# Patient Record
Sex: FEMALE | Race: WHITE | NOT HISPANIC OR LATINO | Employment: FULL TIME | URBAN - METROPOLITAN AREA
[De-identification: names, ages, dates, MRNs, and addresses within clinical notes are randomized per-mention and may not be internally consistent; named-entity substitution may affect disease eponyms.]

---

## 2017-01-11 ENCOUNTER — GENERIC CONVERSION - ENCOUNTER (OUTPATIENT)
Dept: OTHER | Facility: OTHER | Age: 47
End: 2017-01-11

## 2017-02-10 ENCOUNTER — LAB REQUISITION (OUTPATIENT)
Dept: LAB | Facility: HOSPITAL | Age: 47
End: 2017-02-10
Payer: COMMERCIAL

## 2017-02-10 ENCOUNTER — ALLSCRIPTS OFFICE VISIT (OUTPATIENT)
Dept: OTHER | Facility: OTHER | Age: 47
End: 2017-02-10

## 2017-02-10 DIAGNOSIS — D25.9 LEIOMYOMA OF UTERUS: ICD-10-CM

## 2017-02-10 DIAGNOSIS — Z12.31 ENCOUNTER FOR SCREENING MAMMOGRAM FOR MALIGNANT NEOPLASM OF BREAST: ICD-10-CM

## 2017-02-10 DIAGNOSIS — N92.6 IRREGULAR MENSTRUATION: ICD-10-CM

## 2017-02-10 PROCEDURE — 88305 TISSUE EXAM BY PATHOLOGIST: CPT | Performed by: OBSTETRICS & GYNECOLOGY

## 2017-02-16 ENCOUNTER — GENERIC CONVERSION - ENCOUNTER (OUTPATIENT)
Dept: OTHER | Facility: OTHER | Age: 47
End: 2017-02-16

## 2017-02-17 ENCOUNTER — GENERIC CONVERSION - ENCOUNTER (OUTPATIENT)
Dept: OTHER | Facility: OTHER | Age: 47
End: 2017-02-17

## 2017-02-21 ENCOUNTER — GENERIC CONVERSION - ENCOUNTER (OUTPATIENT)
Dept: OTHER | Facility: OTHER | Age: 47
End: 2017-02-21

## 2017-03-23 ENCOUNTER — ALLSCRIPTS OFFICE VISIT (OUTPATIENT)
Dept: OTHER | Facility: OTHER | Age: 47
End: 2017-03-23

## 2017-05-26 RX ORDER — IBUPROFEN 800 MG/1
800 TABLET ORAL EVERY 6 HOURS PRN
COMMUNITY

## 2017-05-26 RX ORDER — CETIRIZINE HYDROCHLORIDE 10 MG/1
10 TABLET ORAL AS NEEDED
COMMUNITY

## 2017-06-05 ENCOUNTER — ANESTHESIA EVENT (OUTPATIENT)
Dept: PERIOP | Facility: HOSPITAL | Age: 47
End: 2017-06-05
Payer: COMMERCIAL

## 2017-06-06 ENCOUNTER — HOSPITAL ENCOUNTER (OUTPATIENT)
Facility: HOSPITAL | Age: 47
Setting detail: OUTPATIENT SURGERY
Discharge: HOME | End: 2017-06-06
Attending: OBSTETRICS & GYNECOLOGY | Admitting: OBSTETRICS & GYNECOLOGY
Payer: COMMERCIAL

## 2017-06-06 ENCOUNTER — ANESTHESIA (OUTPATIENT)
Dept: PERIOP | Facility: HOSPITAL | Age: 47
End: 2017-06-06
Payer: COMMERCIAL

## 2017-06-06 VITALS
SYSTOLIC BLOOD PRESSURE: 149 MMHG | RESPIRATION RATE: 15 BRPM | WEIGHT: 150 LBS | HEIGHT: 66 IN | HEART RATE: 72 BPM | OXYGEN SATURATION: 99 % | TEMPERATURE: 98.3 F | DIASTOLIC BLOOD PRESSURE: 71 MMHG | BODY MASS INDEX: 24.11 KG/M2

## 2017-06-06 DIAGNOSIS — N84.0 POLYP OF CORPUS UTERI: ICD-10-CM

## 2017-06-06 LAB
EXT PREGNANCY TEST URINE: NEGATIVE
HCT VFR BLD AUTO: 39.3 % (ref 34.8–46.1)

## 2017-06-06 PROCEDURE — 85014 HEMATOCRIT: CPT | Performed by: OBSTETRICS & GYNECOLOGY

## 2017-06-06 PROCEDURE — 88305 TISSUE EXAM BY PATHOLOGIST: CPT | Performed by: OBSTETRICS & GYNECOLOGY

## 2017-06-06 PROCEDURE — 81025 URINE PREGNANCY TEST: CPT | Performed by: OBSTETRICS & GYNECOLOGY

## 2017-06-06 RX ORDER — OXYCODONE HYDROCHLORIDE AND ACETAMINOPHEN 5; 325 MG/1; MG/1
1 TABLET ORAL EVERY 4 HOURS PRN
Status: DISCONTINUED | OUTPATIENT
Start: 2017-06-06 | End: 2017-06-06 | Stop reason: HOSPADM

## 2017-06-06 RX ORDER — IBUPROFEN 600 MG/1
600 TABLET ORAL EVERY 6 HOURS PRN
Status: DISCONTINUED | OUTPATIENT
Start: 2017-06-06 | End: 2017-06-06 | Stop reason: HOSPADM

## 2017-06-06 RX ORDER — ONDANSETRON 2 MG/ML
4 INJECTION INTRAMUSCULAR; INTRAVENOUS EVERY 6 HOURS PRN
Status: DISCONTINUED | OUTPATIENT
Start: 2017-06-06 | End: 2017-06-06 | Stop reason: HOSPADM

## 2017-06-06 RX ORDER — MAGNESIUM HYDROXIDE 1200 MG/15ML
LIQUID ORAL AS NEEDED
Status: DISCONTINUED | OUTPATIENT
Start: 2017-06-06 | End: 2017-06-06 | Stop reason: HOSPADM

## 2017-06-06 RX ORDER — ACETAMINOPHEN 325 MG/1
650 TABLET ORAL EVERY 6 HOURS PRN
Status: DISCONTINUED | OUTPATIENT
Start: 2017-06-06 | End: 2017-06-06 | Stop reason: HOSPADM

## 2017-06-06 RX ORDER — ONDANSETRON 2 MG/ML
INJECTION INTRAMUSCULAR; INTRAVENOUS AS NEEDED
Status: DISCONTINUED | OUTPATIENT
Start: 2017-06-06 | End: 2017-06-06 | Stop reason: SURG

## 2017-06-06 RX ORDER — KETOROLAC TROMETHAMINE 30 MG/ML
INJECTION, SOLUTION INTRAMUSCULAR; INTRAVENOUS AS NEEDED
Status: DISCONTINUED | OUTPATIENT
Start: 2017-06-06 | End: 2017-06-06 | Stop reason: SURG

## 2017-06-06 RX ORDER — SODIUM CHLORIDE 9 MG/ML
125 INJECTION, SOLUTION INTRAVENOUS CONTINUOUS
Status: DISCONTINUED | OUTPATIENT
Start: 2017-06-06 | End: 2017-06-06 | Stop reason: HOSPADM

## 2017-06-06 RX ORDER — PROPOFOL 10 MG/ML
INJECTION, EMULSION INTRAVENOUS AS NEEDED
Status: DISCONTINUED | OUTPATIENT
Start: 2017-06-06 | End: 2017-06-06 | Stop reason: SURG

## 2017-06-06 RX ORDER — FENTANYL CITRATE/PF 50 MCG/ML
50 SYRINGE (ML) INJECTION
Status: DISCONTINUED | OUTPATIENT
Start: 2017-06-06 | End: 2017-06-06 | Stop reason: HOSPADM

## 2017-06-06 RX ORDER — SODIUM CHLORIDE 9 MG/ML
INJECTION, SOLUTION INTRAVENOUS AS NEEDED
Status: DISCONTINUED | OUTPATIENT
Start: 2017-06-06 | End: 2017-06-06 | Stop reason: HOSPADM

## 2017-06-06 RX ORDER — ONDANSETRON 2 MG/ML
4 INJECTION INTRAMUSCULAR; INTRAVENOUS ONCE AS NEEDED
Status: DISCONTINUED | OUTPATIENT
Start: 2017-06-06 | End: 2017-06-06 | Stop reason: HOSPADM

## 2017-06-06 RX ADMIN — SODIUM CHLORIDE 125 ML/HR: 0.9 INJECTION, SOLUTION INTRAVENOUS at 13:08

## 2017-06-06 RX ADMIN — LIDOCAINE HYDROCHLORIDE 100 MG: 20 INJECTION, SOLUTION INTRAVENOUS at 13:21

## 2017-06-06 RX ADMIN — DEXAMETHASONE SODIUM PHOSPHATE 4 MG: 10 INJECTION INTRAMUSCULAR; INTRAVENOUS at 13:25

## 2017-06-06 RX ADMIN — PROPOFOL 200 MG: 10 INJECTION, EMULSION INTRAVENOUS at 13:21

## 2017-06-06 RX ADMIN — ONDANSETRON HYDROCHLORIDE 4 MG: 2 INJECTION, SOLUTION INTRAVENOUS at 13:25

## 2017-06-06 RX ADMIN — KETOROLAC TROMETHAMINE 30 MG: 30 INJECTION, SOLUTION INTRAMUSCULAR at 13:35

## 2017-06-09 ENCOUNTER — GENERIC CONVERSION - ENCOUNTER (OUTPATIENT)
Dept: OTHER | Facility: OTHER | Age: 47
End: 2017-06-09

## 2017-08-27 ENCOUNTER — OFFICE VISIT (OUTPATIENT)
Dept: URGENT CARE | Facility: CLINIC | Age: 47
End: 2017-08-27
Payer: COMMERCIAL

## 2017-08-27 PROCEDURE — 99213 OFFICE O/P EST LOW 20 MIN: CPT

## 2018-01-09 NOTE — MISCELLANEOUS
Message   Recorded as Task   Date: 02/21/2017 06:26 AM, Created By: Dutch Orourke   Task Name: Follow Up   Assigned To: Deshawn Clay   Regarding Patient: Richard Esquivel, Status: Active   Comment:    Dutch Orourke - 21 Feb 2017 6:26 AM     TASK CREATED  please let pt know her US is normal with 2 small fibroids   Mi Lucio - 21 Feb 2017 10:42 AM     TASK EDITED  left message with pt        Active Problems    1  Breast disorder (611 9) (N64 9)   2  Breast self examination education, encounter for (V65 49) (Z71 89)   3  Edema (782 3) (R60 9)   4  Fibroids (218 9) (D25 9)   5  Heavy menses (626 2) (N92 0)   6  Irregular bleeding (626 4) (N92 6)   7  Irregular bleeding (626 4) (N92 6)   8  Other specified menopausal and perimenopausal disorders (627 8) (N95 8)   9  Visit for screening mammogram (V76 12) (Z12 31)    Current Meds   1  Furosemide 20 MG Oral Tablet (Lasix); TAKE 1 TABLET DAILY AS NEEDED; Therapy: 26KXL6778 to (Evaluate:02Mar2017)  Requested for: 52KYA2982; Last   Rx:[id number] Ordered    Allergies    1   No Known Drug Allergies    Signatures   Electronically signed by : Afshan Torres, ; Feb 21 2017 10:42AM EST                       (Author)

## 2018-01-11 NOTE — MISCELLANEOUS
Message   Recorded as Task   Date: 06/07/2017 10:03 AM, Created By: Abhi Romo   Task Name: Follow Up   Assigned To: Lilliana Ward   Regarding Patient: Jackson Cyr, Status: Active   CommentAria Miles - 07 Jun 2017 10:03 AM     TASK CREATED  Called patient for post op check  States feels fine, no pain, no fever, scant bleeding, slept well, bladder WNL  Thanks you for everything  Lindsay Chin - 07 Jun 2017 10:47 PM     TASK REPLIED TO: Previously Assigned To Lindsay kim        Active Problems    1  Breast disorder (611 9) (N64 9)   2  Breast self examination education, encounter for (V65 49) (Z71 89)   3  Edema (782 3) (R60 9)   4  Fibroids (218 9) (D25 9)   5  Heavy menses (626 2) (N92 0)   6  Irregular bleeding (626 4) (N92 6)   7  Irregular bleeding (626 4) (N92 6)   8  Other specified menopausal and perimenopausal disorders (627 8) (N95 8)   9  Visit for screening mammogram (V76 12) (Z12 31)    Current Meds   1  Furosemide 20 MG Oral Tablet (Lasix); TAKE 1 TABLET DAILY AS NEEDED; Therapy: 98KJF3536 to (Evaluate:02Mar2017)  Requested for: 25VDC2579; Last   Rx:[id number] Ordered    Allergies    1   No Known Drug Allergies    Signatures   Electronically signed by : Anoop Hahn, ; Jun 9 2017  8:14AM EST                       (Author)

## 2018-01-13 VITALS
WEIGHT: 166.25 LBS | DIASTOLIC BLOOD PRESSURE: 76 MMHG | HEIGHT: 65 IN | BODY MASS INDEX: 27.7 KG/M2 | SYSTOLIC BLOOD PRESSURE: 120 MMHG

## 2018-01-15 VITALS
HEIGHT: 65 IN | BODY MASS INDEX: 27.36 KG/M2 | DIASTOLIC BLOOD PRESSURE: 76 MMHG | WEIGHT: 164.25 LBS | SYSTOLIC BLOOD PRESSURE: 120 MMHG

## 2018-01-16 NOTE — MISCELLANEOUS
Message   Recorded as Task   Date: 02/14/2017 08:36 PM, Created By: Dutch Orourke   Task Name: Go to Result   Assigned To: Caseymikylah Presumkylah   Regarding Patient: Richard Esquivel, Status: In Progress   Comment:    Dutch Orourke - 14 Feb 2017 8:36 PM     TASK CREATED  please let pt know that her emb is benign and there is a benign polyp, would recommend removing it as it is most likely contributing to her irregular bleeding  We discussed this briefly at her visit  Mi uLcio - 15 Feb 2017 1:44 PM     TASK IN PROGRESS   Mi Lucio - 16 Feb 2017 9:17 AM     TASK EDITED  pt will schedule apt wo discuss surgery and cc after        Active Problems    1  Breast disorder (611 9) (N64 9)   2  Breast self examination education, encounter for (V65 49) (Z71 89)   3  Edema (782 3) (R60 9)   4  Fibroids (218 9) (D25 9)   5  Heavy menses (626 2) (N92 0)   6  Irregular bleeding (626 4) (N92 6)   7  Irregular bleeding (626 4) (N92 6)   8  Other specified menopausal and perimenopausal disorders (627 8) (N95 8)   9  Visit for screening mammogram (V76 12) (Z12 31)    Current Meds   1  Furosemide 20 MG Oral Tablet (Lasix); TAKE 1 TABLET DAILY AS NEEDED; Therapy: 12HIP6027 to (Evaluate:02Mar2017)  Requested for: 07LPC4354; Last   Rx:[id number] Ordered    Allergies    1   No Known Drug Allergies    Signatures   Electronically signed by : Afshan Torres, ; Feb 16 2017  9:17AM EST                       (Author)

## 2018-01-17 NOTE — MISCELLANEOUS
Message   Recorded as Task   Date: 06/09/2017 08:20 AM, Created By: Roxann Box   Task Name: Go to Result   Assigned To: Noam Anderson   Regarding Patient: Shalonda Garcia, Status: Active   Comment:    Roxann Box - 09 Jun 2017 8:20 AM     TASK CREATED  benign pathology from her D&C, if she is doing well and feels she does not need post op, does not need to come in   Patient informed  Feels fine  will cancel post op appointment  Active Problems    1  Breast disorder (611 9) (N64 9)   2  Breast self examination education, encounter for (V65 49) (Z71 89)   3  Edema (782 3) (R60 9)   4  Fibroids (218 9) (D25 9)   5  Heavy menses (626 2) (N92 0)   6  Irregular bleeding (626 4) (N92 6)   7  Irregular bleeding (626 4) (N92 6)   8  Other specified menopausal and perimenopausal disorders (627 8) (N95 8)   9  Visit for screening mammogram (V76 12) (Z12 31)    Current Meds   1  Furosemide 20 MG Oral Tablet (Lasix); TAKE 1 TABLET DAILY AS NEEDED; Therapy: 84RHV6223 to (Evaluate:02Mar2017)  Requested for: 61LBO7557; Last   Rx:[id number] Ordered    Allergies    1   No Known Drug Allergies    Signatures   Electronically signed by : Pillo Spangler, ; Jun 9 2017  2:03PM EST                       (Author)

## 2018-01-18 NOTE — MISCELLANEOUS
Message   Date: 11 Jan 2017 11:00 AM EST, Recorded By: Melissa Silva For: Sharona Anita: Giana Hagan, Self   Phone: [phone] [facility]), [phone] (Work)   Reason: Medical Complaint   pt is getting her cycle every 2 weeks    pt will schedule appt with enough time for a endo bx if needed    Active Problems    1  Breast disorder (611 9) (N64 9)   2  Breast self examination education, encounter for (V65 49) (Z71 89)   3  Edema (782 3) (R60 9)   4  Heavy menses (626 2) (N92 0)   5  Other specified menopausal and perimenopausal disorders (627 8) (N95 8)    Current Meds   1  Furosemide 20 MG Oral Tablet (Lasix); Take as directed; Therapy: 20OXY7109 to (Evaluate:85Yvf2430)  Requested for: 42CEP8156; Last   Rx:[id number] Ordered    Allergies    1   No Known Drug Allergies    Signatures   Electronically signed by : Lona Sotelo, ; Jan 11 2017 11:01AM EST                       (Author)

## 2018-01-24 DIAGNOSIS — Z12.31 ENCOUNTER FOR SCREENING MAMMOGRAM FOR MALIGNANT NEOPLASM OF BREAST: Primary | ICD-10-CM

## 2018-02-01 ENCOUNTER — TELEPHONE (OUTPATIENT)
Dept: OBGYN CLINIC | Facility: CLINIC | Age: 48
End: 2018-02-01

## 2018-08-20 ENCOUNTER — TRANSCRIBE ORDERS (OUTPATIENT)
Dept: ADMINISTRATIVE | Facility: HOSPITAL | Age: 48
End: 2018-08-20

## 2018-08-20 ENCOUNTER — HOSPITAL ENCOUNTER (OUTPATIENT)
Dept: RADIOLOGY | Facility: HOSPITAL | Age: 48
Discharge: HOME | End: 2018-08-20
Payer: COMMERCIAL

## 2018-08-20 ENCOUNTER — APPOINTMENT (OUTPATIENT)
Dept: LAB | Facility: HOSPITAL | Age: 48
End: 2018-08-20
Payer: COMMERCIAL

## 2018-08-20 DIAGNOSIS — R07.89 CHEST TIGHTNESS: ICD-10-CM

## 2018-08-20 DIAGNOSIS — M06.9 RHEUMATOID ARTHRITIS, INVOLVING UNSPECIFIED SITE, UNSPECIFIED RHEUMATOID FACTOR PRESENCE: ICD-10-CM

## 2018-08-20 DIAGNOSIS — Z82.49 FAMILY HISTORY OF EARLY CAD: ICD-10-CM

## 2018-08-20 DIAGNOSIS — R06.02 SOB (SHORTNESS OF BREATH): ICD-10-CM

## 2018-08-20 DIAGNOSIS — R06.02 SOB (SHORTNESS OF BREATH): Primary | ICD-10-CM

## 2018-08-20 LAB
ALBUMIN SERPL BCP-MCNC: 4.2 G/DL (ref 3.5–5.7)
ALP SERPL-CCNC: 75 U/L (ref 40–150)
ALT SERPL W P-5'-P-CCNC: 9 U/L (ref 7–52)
ANION GAP SERPL CALCULATED.3IONS-SCNC: 5 MMOL/L (ref 4–13)
AST SERPL W P-5'-P-CCNC: 15 U/L (ref 13–39)
BACTERIA UR QL AUTO: ABNORMAL /HPF
BASOPHILS # BLD AUTO: 0.1 THOUSANDS/ΜL (ref 0–0.1)
BASOPHILS NFR BLD AUTO: 1 % (ref 0–2)
BILIRUB SERPL-MCNC: 0.3 MG/DL (ref 0.2–1)
BILIRUB UR QL STRIP: NEGATIVE
BNP SERPL-MCNC: 32 PG/ML (ref 1–100)
BUN SERPL-MCNC: 12 MG/DL (ref 7–25)
CALCIUM SERPL-MCNC: 9.2 MG/DL (ref 8.6–10.5)
CHLORIDE SERPL-SCNC: 102 MMOL/L (ref 98–107)
CHOLEST SERPL-MCNC: 189 MG/DL (ref 0–200)
CLARITY UR: ABNORMAL
CO2 SERPL-SCNC: 29 MMOL/L (ref 21–31)
COLOR UR: ABNORMAL
CREAT SERPL-MCNC: 0.79 MG/DL (ref 0.6–1.2)
CRP SERPL QL: 3.2 MG/L
EOSINOPHIL # BLD AUTO: 0.3 THOUSAND/ΜL (ref 0–0.61)
EOSINOPHIL NFR BLD AUTO: 4 % (ref 0–5)
ERYTHROCYTE [DISTWIDTH] IN BLOOD BY AUTOMATED COUNT: 13.1 % (ref 11.5–14.5)
ERYTHROCYTE [SEDIMENTATION RATE] IN BLOOD: 18 MM/HOUR (ref 0–20)
GFR SERPL CREATININE-BSD FRML MDRD: 89 ML/MIN/1.73SQ M
GLUCOSE P FAST SERPL-MCNC: 86 MG/DL (ref 65–99)
GLUCOSE UR STRIP-MCNC: NEGATIVE MG/DL
HCT VFR BLD AUTO: 37.1 % (ref 34.8–46.1)
HDLC SERPL-MCNC: 54 MG/DL (ref 40–60)
HGB BLD-MCNC: 12.7 G/DL (ref 12–16)
HGB UR QL STRIP.AUTO: ABNORMAL
KETONES UR STRIP-MCNC: NEGATIVE MG/DL
LDLC SERPL CALC-MCNC: 115 MG/DL (ref 75–193)
LEUKOCYTE ESTERASE UR QL STRIP: NEGATIVE
LYMPHOCYTES # BLD AUTO: 1.6 THOUSANDS/ΜL (ref 0.6–4.47)
LYMPHOCYTES NFR BLD AUTO: 23 % (ref 21–51)
MCH RBC QN AUTO: 30.2 PG (ref 26–34)
MCHC RBC AUTO-ENTMCNC: 34.2 G/DL (ref 31–37)
MCV RBC AUTO: 88 FL (ref 81–99)
MONOCYTES # BLD AUTO: 0.5 THOUSAND/ΜL (ref 0.17–1.22)
MONOCYTES NFR BLD AUTO: 7 % (ref 2–12)
MUCOUS THREADS UR QL AUTO: ABNORMAL
NEUTROPHILS # BLD AUTO: 4.5 THOUSANDS/ΜL (ref 1.4–6.5)
NEUTS SEG NFR BLD AUTO: 65 % (ref 42–75)
NITRITE UR QL STRIP: NEGATIVE
NON-SQ EPI CELLS URNS QL MICRO: ABNORMAL /HPF
NONHDLC SERPL-MCNC: 135 MG/DL
NRBC BLD AUTO-RTO: 0 /100 WBCS
PH UR STRIP.AUTO: 6.5 [PH] (ref 5–8)
PLATELET # BLD AUTO: 239 THOUSANDS/UL (ref 149–390)
PMV BLD AUTO: 8.5 FL (ref 8.6–11.7)
POTASSIUM SERPL-SCNC: 3.9 MMOL/L (ref 3.5–5.5)
PROT SERPL-MCNC: 7.6 G/DL (ref 6.4–8.9)
PROT UR STRIP-MCNC: NEGATIVE MG/DL
RBC # BLD AUTO: 4.21 MILLION/UL (ref 3.9–5.2)
RBC #/AREA URNS AUTO: ABNORMAL /HPF
SODIUM SERPL-SCNC: 136 MMOL/L (ref 134–143)
SP GR UR STRIP.AUTO: 1.02 (ref 1–1.03)
TRIGL SERPL-MCNC: 100 MG/DL (ref 44–166)
TSH SERPL DL<=0.05 MIU/L-ACNC: 3.33 UIU/ML (ref 0.45–5.33)
UROBILINOGEN UR QL STRIP.AUTO: 0.2 E.U./DL
WBC # BLD AUTO: 6.9 THOUSAND/UL (ref 4.8–10.8)
WBC #/AREA URNS AUTO: ABNORMAL /HPF

## 2018-08-20 PROCEDURE — 86618 LYME DISEASE ANTIBODY: CPT

## 2018-08-20 PROCEDURE — 36415 COLL VENOUS BLD VENIPUNCTURE: CPT

## 2018-08-20 PROCEDURE — 86200 CCP ANTIBODY: CPT

## 2018-08-20 PROCEDURE — 71046 X-RAY EXAM CHEST 2 VIEWS: CPT

## 2018-08-20 PROCEDURE — 86038 ANTINUCLEAR ANTIBODIES: CPT

## 2018-08-20 PROCEDURE — 85025 COMPLETE CBC W/AUTO DIFF WBC: CPT

## 2018-08-20 PROCEDURE — 86430 RHEUMATOID FACTOR TEST QUAL: CPT

## 2018-08-20 PROCEDURE — 84443 ASSAY THYROID STIM HORMONE: CPT

## 2018-08-20 PROCEDURE — 86235 NUCLEAR ANTIGEN ANTIBODY: CPT

## 2018-08-20 PROCEDURE — 86039 ANTINUCLEAR ANTIBODIES (ANA): CPT

## 2018-08-20 PROCEDURE — 83880 ASSAY OF NATRIURETIC PEPTIDE: CPT

## 2018-08-20 PROCEDURE — 81001 URINALYSIS AUTO W/SCOPE: CPT | Performed by: NURSE PRACTITIONER

## 2018-08-20 PROCEDURE — 80053 COMPREHEN METABOLIC PANEL: CPT

## 2018-08-20 PROCEDURE — 80061 LIPID PANEL: CPT

## 2018-08-20 PROCEDURE — 85652 RBC SED RATE AUTOMATED: CPT

## 2018-08-20 PROCEDURE — 81003 URINALYSIS AUTO W/O SCOPE: CPT | Performed by: NURSE PRACTITIONER

## 2018-08-20 PROCEDURE — 86140 C-REACTIVE PROTEIN: CPT

## 2018-08-21 LAB
B BURGDOR IGG SER IA-ACNC: 0.11
B BURGDOR IGM SER IA-ACNC: 0.18
CCP IGA+IGG SERPL IA-ACNC: 10 UNITS (ref 0–19)
ENA SS-A AB SER-ACNC: <0.2 AI (ref 0–0.9)
ENA SS-B AB SER-ACNC: <0.2 AI (ref 0–0.9)
RHEUMATOID FACT SER QL LA: NEGATIVE

## 2018-08-22 LAB
ANA HOMOGEN SER QL IF: NORMAL
ANA HOMOGEN TITR SER: NORMAL {TITER}
RYE IGE QN: POSITIVE

## 2019-05-23 ENCOUNTER — ANNUAL EXAM (OUTPATIENT)
Dept: OBGYN CLINIC | Facility: CLINIC | Age: 49
End: 2019-05-23
Payer: COMMERCIAL

## 2019-05-23 VITALS
HEIGHT: 66 IN | WEIGHT: 183 LBS | SYSTOLIC BLOOD PRESSURE: 134 MMHG | BODY MASS INDEX: 29.41 KG/M2 | DIASTOLIC BLOOD PRESSURE: 70 MMHG

## 2019-05-23 DIAGNOSIS — Z11.51 SCREENING FOR HPV (HUMAN PAPILLOMAVIRUS): ICD-10-CM

## 2019-05-23 DIAGNOSIS — Z12.31 ENCOUNTER FOR SCREENING MAMMOGRAM FOR BREAST CANCER: ICD-10-CM

## 2019-05-23 DIAGNOSIS — Z12.4 CERVICAL CANCER SCREENING: ICD-10-CM

## 2019-05-23 DIAGNOSIS — Z01.419 ENCOUNTER FOR ANNUAL ROUTINE GYNECOLOGICAL EXAMINATION: Primary | ICD-10-CM

## 2019-05-23 PROCEDURE — 88141 CYTOPATH C/V INTERPRET: CPT | Performed by: PATHOLOGY

## 2019-05-23 PROCEDURE — G0143 SCR C/V CYTO,THINLAYER,RESCR: HCPCS | Performed by: PATHOLOGY

## 2019-05-23 PROCEDURE — S0612 ANNUAL GYNECOLOGICAL EXAMINA: HCPCS | Performed by: OBSTETRICS & GYNECOLOGY

## 2019-05-23 PROCEDURE — 87624 HPV HI-RISK TYP POOLED RSLT: CPT | Performed by: OBSTETRICS & GYNECOLOGY

## 2019-05-23 RX ORDER — ALBUTEROL SULFATE 90 UG/1
2 AEROSOL, METERED RESPIRATORY (INHALATION) EVERY 4 HOURS PRN
COMMUNITY
Start: 2019-05-21

## 2019-05-28 LAB
HPV HR 12 DNA CVX QL NAA+PROBE: NEGATIVE
HPV16 DNA CVX QL NAA+PROBE: NEGATIVE
HPV18 DNA CVX QL NAA+PROBE: NEGATIVE

## 2019-06-03 LAB
LAB AP GYN PRIMARY INTERPRETATION: NORMAL
Lab: NORMAL
PATH INTERP SPEC-IMP: NORMAL

## 2019-06-08 ENCOUNTER — HOSPITAL ENCOUNTER (OUTPATIENT)
Dept: MAMMOGRAPHY | Facility: HOSPITAL | Age: 49
Discharge: HOME | End: 2019-06-08
Payer: COMMERCIAL

## 2019-06-08 VITALS — WEIGHT: 175 LBS | HEIGHT: 66 IN | BODY MASS INDEX: 28.12 KG/M2

## 2019-06-08 DIAGNOSIS — Z12.31 ENCOUNTER FOR SCREENING MAMMOGRAM FOR BREAST CANCER: ICD-10-CM

## 2019-06-08 PROCEDURE — 77067 SCR MAMMO BI INCL CAD: CPT

## 2019-06-08 PROCEDURE — 77063 BREAST TOMOSYNTHESIS BI: CPT

## 2019-06-28 ENCOUNTER — PROCEDURE VISIT (OUTPATIENT)
Dept: OBGYN CLINIC | Facility: CLINIC | Age: 49
End: 2019-06-28
Payer: COMMERCIAL

## 2019-06-28 VITALS — BODY MASS INDEX: 29.38 KG/M2 | SYSTOLIC BLOOD PRESSURE: 130 MMHG | DIASTOLIC BLOOD PRESSURE: 82 MMHG | WEIGHT: 182 LBS

## 2019-06-28 DIAGNOSIS — R87.619 ENDOMETRIAL CELLS ON CERVICAL PAP SMEAR INCONSISTENT W/LMP: Primary | ICD-10-CM

## 2019-06-28 DIAGNOSIS — N92.1 MENORRHAGIA WITH IRREGULAR CYCLE: Primary | ICD-10-CM

## 2019-06-28 DIAGNOSIS — N92.6 IRREGULAR MENSES: ICD-10-CM

## 2019-06-28 PROCEDURE — G0145 SCR C/V CYTO,THINLAYER,RESCR: HCPCS | Performed by: OBSTETRICS & GYNECOLOGY

## 2019-06-28 PROCEDURE — 58340 CATHETER FOR HYSTEROGRAPHY: CPT | Performed by: OBSTETRICS & GYNECOLOGY

## 2019-06-28 PROCEDURE — 76830 TRANSVAGINAL US NON-OB: CPT | Performed by: OBSTETRICS & GYNECOLOGY

## 2019-06-28 PROCEDURE — 99213 OFFICE O/P EST LOW 20 MIN: CPT | Performed by: OBSTETRICS & GYNECOLOGY

## 2019-06-28 PROCEDURE — 76831 ECHO EXAM UTERUS: CPT | Performed by: OBSTETRICS & GYNECOLOGY

## 2019-07-05 ENCOUNTER — TELEPHONE (OUTPATIENT)
Dept: OBGYN CLINIC | Facility: CLINIC | Age: 49
End: 2019-07-05

## 2019-07-05 LAB
LAB AP GYN PRIMARY INTERPRETATION: NORMAL
Lab: NORMAL

## 2019-07-22 PROBLEM — N92.6 IRREGULAR MENSES: Status: ACTIVE | Noted: 2019-07-22

## 2019-08-20 ENCOUNTER — HOSPITAL ENCOUNTER (OUTPATIENT)
Dept: NON INVASIVE DIAGNOSTICS | Facility: HOSPITAL | Age: 49
Discharge: HOME | End: 2019-08-20
Payer: COMMERCIAL

## 2019-08-20 DIAGNOSIS — Z01.818 PRE-OP TESTING: ICD-10-CM

## 2019-08-20 LAB
ATRIAL RATE: 87 BPM
P AXIS: 57 DEGREES
PR INTERVAL: 146 MS
QRS AXIS: 58 DEGREES
QRSD INTERVAL: 88 MS
QT INTERVAL: 372 MS
QTC INTERVAL: 447 MS
T WAVE AXIS: 28 DEGREES
VENTRICULAR RATE: 87 BPM

## 2019-08-20 PROCEDURE — 93005 ELECTROCARDIOGRAM TRACING: CPT

## 2019-08-20 PROCEDURE — 93010 ELECTROCARDIOGRAM REPORT: CPT | Performed by: INTERNAL MEDICINE

## 2019-08-24 ENCOUNTER — APPOINTMENT (OUTPATIENT)
Dept: LAB | Facility: HOSPITAL | Age: 49
End: 2019-08-24
Payer: COMMERCIAL

## 2019-08-24 DIAGNOSIS — Z01.818 PRE-OP TESTING: ICD-10-CM

## 2019-08-24 LAB
ANION GAP SERPL CALCULATED.3IONS-SCNC: 7 MMOL/L (ref 4–13)
ATRIAL RATE: 87 BPM
BUN SERPL-MCNC: 15 MG/DL (ref 7–25)
CALCIUM SERPL-MCNC: 9.4 MG/DL (ref 8.6–10.5)
CHLORIDE SERPL-SCNC: 102 MMOL/L (ref 98–107)
CO2 SERPL-SCNC: 27 MMOL/L (ref 21–31)
CREAT SERPL-MCNC: 0.83 MG/DL (ref 0.6–1.2)
ERYTHROCYTE [DISTWIDTH] IN BLOOD BY AUTOMATED COUNT: 12.9 % (ref 11.5–14.5)
GFR SERPL CREATININE-BSD FRML MDRD: 83 ML/MIN/1.73SQ M
GLUCOSE P FAST SERPL-MCNC: 90 MG/DL (ref 65–99)
HCT VFR BLD AUTO: 38.7 % (ref 42–47)
HGB BLD-MCNC: 13 G/DL (ref 12–16)
MCH RBC QN AUTO: 29.7 PG (ref 26–34)
MCHC RBC AUTO-ENTMCNC: 33.7 G/DL (ref 31–37)
MCV RBC AUTO: 88 FL (ref 81–99)
P AXIS: 57 DEGREES
PLATELET # BLD AUTO: 268 THOUSANDS/UL (ref 149–390)
PMV BLD AUTO: 7.9 FL (ref 8.6–11.7)
POTASSIUM SERPL-SCNC: 4.2 MMOL/L (ref 3.5–5.5)
PR INTERVAL: 146 MS
QRS AXIS: 58 DEGREES
QRSD INTERVAL: 88 MS
QT INTERVAL: 372 MS
QTC INTERVAL: 447 MS
RBC # BLD AUTO: 4.38 MILLION/UL (ref 3.9–5.2)
SODIUM SERPL-SCNC: 136 MMOL/L (ref 134–143)
T WAVE AXIS: 28 DEGREES
VENTRICULAR RATE: 87 BPM
WBC # BLD AUTO: 7.4 THOUSAND/UL (ref 4.8–10.8)

## 2019-08-24 PROCEDURE — 85027 COMPLETE CBC AUTOMATED: CPT

## 2019-08-24 PROCEDURE — 80048 BASIC METABOLIC PNL TOTAL CA: CPT

## 2019-08-24 PROCEDURE — 93010 ELECTROCARDIOGRAM REPORT: CPT | Performed by: INTERNAL MEDICINE

## 2019-08-24 PROCEDURE — 36415 COLL VENOUS BLD VENIPUNCTURE: CPT

## 2019-08-29 NOTE — PRE-PROCEDURE INSTRUCTIONS
Pre-Surgery Instructions:   Medication Instructions    albuterol (PROVENTIL HFA,VENTOLIN HFA) 90 mcg/act inhaler Instructed patient per Anesthesia Guidelines   cetirizine (ZyrTEC) 10 mg tablet Instructed patient per Anesthesia Guidelines   ibuprofen (MOTRIN) 800 mg tablet Patient was instructed by Physician and understands  Instructed to use albuterol inhaler am of surgery if needed per anesthesia

## 2019-08-30 ENCOUNTER — ANESTHESIA EVENT (OUTPATIENT)
Dept: PERIOP | Facility: HOSPITAL | Age: 49
End: 2019-08-30
Payer: COMMERCIAL

## 2019-09-03 ENCOUNTER — ANESTHESIA (OUTPATIENT)
Dept: PERIOP | Facility: HOSPITAL | Age: 49
End: 2019-09-03
Payer: COMMERCIAL

## 2019-09-03 ENCOUNTER — HOSPITAL ENCOUNTER (OUTPATIENT)
Facility: HOSPITAL | Age: 49
Setting detail: OUTPATIENT SURGERY
Discharge: HOME | End: 2019-09-03
Attending: OBSTETRICS & GYNECOLOGY | Admitting: OBSTETRICS & GYNECOLOGY
Payer: COMMERCIAL

## 2019-09-03 VITALS
DIASTOLIC BLOOD PRESSURE: 73 MMHG | SYSTOLIC BLOOD PRESSURE: 146 MMHG | HEART RATE: 78 BPM | BODY MASS INDEX: 28.93 KG/M2 | TEMPERATURE: 98.1 F | RESPIRATION RATE: 16 BRPM | HEIGHT: 66 IN | WEIGHT: 180 LBS | OXYGEN SATURATION: 99 %

## 2019-09-03 DIAGNOSIS — N92.6 IRREGULAR MENSES: Primary | ICD-10-CM

## 2019-09-03 LAB
EXT PREGNANCY TEST URINE: NEGATIVE
EXT. CONTROL: NORMAL

## 2019-09-03 PROCEDURE — 81025 URINE PREGNANCY TEST: CPT | Performed by: ANESTHESIOLOGY

## 2019-09-03 PROCEDURE — 88305 TISSUE EXAM BY PATHOLOGIST: CPT | Performed by: PATHOLOGY

## 2019-09-03 PROCEDURE — 58558 HYSTEROSCOPY BIOPSY: CPT | Performed by: OBSTETRICS & GYNECOLOGY

## 2019-09-03 PROCEDURE — NC001 PR NO CHARGE: Performed by: OBSTETRICS & GYNECOLOGY

## 2019-09-03 RX ORDER — SODIUM CHLORIDE 9 MG/ML
INJECTION, SOLUTION INTRAVENOUS AS NEEDED
Status: DISCONTINUED | OUTPATIENT
Start: 2019-09-03 | End: 2019-09-03 | Stop reason: HOSPADM

## 2019-09-03 RX ORDER — IBUPROFEN 600 MG/1
600 TABLET ORAL EVERY 6 HOURS PRN
Status: DISCONTINUED | OUTPATIENT
Start: 2019-09-03 | End: 2019-09-03 | Stop reason: HOSPADM

## 2019-09-03 RX ORDER — ALBUTEROL SULFATE 90 UG/1
2 AEROSOL, METERED RESPIRATORY (INHALATION) EVERY 4 HOURS PRN
Status: DISCONTINUED | OUTPATIENT
Start: 2019-09-03 | End: 2019-09-03 | Stop reason: HOSPADM

## 2019-09-03 RX ORDER — MAGNESIUM HYDROXIDE 1200 MG/15ML
LIQUID ORAL AS NEEDED
Status: DISCONTINUED | OUTPATIENT
Start: 2019-09-03 | End: 2019-09-03 | Stop reason: HOSPADM

## 2019-09-03 RX ORDER — DEXAMETHASONE SODIUM PHOSPHATE 4 MG/ML
INJECTION, SOLUTION INTRA-ARTICULAR; INTRALESIONAL; INTRAMUSCULAR; INTRAVENOUS; SOFT TISSUE AS NEEDED
Status: DISCONTINUED | OUTPATIENT
Start: 2019-09-03 | End: 2019-09-03 | Stop reason: SURG

## 2019-09-03 RX ORDER — PROPOFOL 10 MG/ML
INJECTION, EMULSION INTRAVENOUS AS NEEDED
Status: DISCONTINUED | OUTPATIENT
Start: 2019-09-03 | End: 2019-09-03 | Stop reason: SURG

## 2019-09-03 RX ORDER — FENTANYL CITRATE 50 UG/ML
INJECTION, SOLUTION INTRAMUSCULAR; INTRAVENOUS AS NEEDED
Status: DISCONTINUED | OUTPATIENT
Start: 2019-09-03 | End: 2019-09-03 | Stop reason: SURG

## 2019-09-03 RX ORDER — ACETAMINOPHEN 325 MG/1
650 TABLET ORAL EVERY 6 HOURS PRN
Status: DISCONTINUED | OUTPATIENT
Start: 2019-09-03 | End: 2019-09-03 | Stop reason: HOSPADM

## 2019-09-03 RX ORDER — LIDOCAINE HYDROCHLORIDE 10 MG/ML
INJECTION, SOLUTION EPIDURAL; INFILTRATION; INTRACAUDAL; PERINEURAL AS NEEDED
Status: DISCONTINUED | OUTPATIENT
Start: 2019-09-03 | End: 2019-09-03 | Stop reason: HOSPADM

## 2019-09-03 RX ORDER — FENTANYL CITRATE/PF 50 MCG/ML
25 SYRINGE (ML) INJECTION
Status: DISCONTINUED | OUTPATIENT
Start: 2019-09-03 | End: 2019-09-03 | Stop reason: HOSPADM

## 2019-09-03 RX ORDER — PROPOFOL 10 MG/ML
INJECTION, EMULSION INTRAVENOUS CONTINUOUS PRN
Status: DISCONTINUED | OUTPATIENT
Start: 2019-09-03 | End: 2019-09-03 | Stop reason: SURG

## 2019-09-03 RX ORDER — KETOROLAC TROMETHAMINE 30 MG/ML
INJECTION, SOLUTION INTRAMUSCULAR; INTRAVENOUS AS NEEDED
Status: DISCONTINUED | OUTPATIENT
Start: 2019-09-03 | End: 2019-09-03 | Stop reason: SURG

## 2019-09-03 RX ORDER — ONDANSETRON 2 MG/ML
4 INJECTION INTRAMUSCULAR; INTRAVENOUS ONCE AS NEEDED
Status: DISCONTINUED | OUTPATIENT
Start: 2019-09-03 | End: 2019-09-03 | Stop reason: HOSPADM

## 2019-09-03 RX ORDER — ACETAMINOPHEN 325 MG/1
650 TABLET ORAL EVERY 6 HOURS PRN
Qty: 30 TABLET | Refills: 0
Start: 2019-09-03

## 2019-09-03 RX ORDER — IBUPROFEN 200 MG
600 TABLET ORAL EVERY 6 HOURS PRN
Refills: 0
Start: 2019-09-03

## 2019-09-03 RX ORDER — OXYCODONE HYDROCHLORIDE 5 MG/1
5 TABLET ORAL EVERY 4 HOURS PRN
Status: DISCONTINUED | OUTPATIENT
Start: 2019-09-03 | End: 2019-09-03 | Stop reason: HOSPADM

## 2019-09-03 RX ORDER — ONDANSETRON 2 MG/ML
4 INJECTION INTRAMUSCULAR; INTRAVENOUS EVERY 6 HOURS PRN
Status: DISCONTINUED | OUTPATIENT
Start: 2019-09-03 | End: 2019-09-03 | Stop reason: HOSPADM

## 2019-09-03 RX ORDER — SODIUM CHLORIDE 9 MG/ML
125 INJECTION, SOLUTION INTRAVENOUS CONTINUOUS
Status: DISCONTINUED | OUTPATIENT
Start: 2019-09-03 | End: 2019-09-03

## 2019-09-03 RX ORDER — ONDANSETRON 2 MG/ML
INJECTION INTRAMUSCULAR; INTRAVENOUS AS NEEDED
Status: DISCONTINUED | OUTPATIENT
Start: 2019-09-03 | End: 2019-09-03 | Stop reason: SURG

## 2019-09-03 RX ORDER — MIDAZOLAM HYDROCHLORIDE 1 MG/ML
INJECTION INTRAMUSCULAR; INTRAVENOUS AS NEEDED
Status: DISCONTINUED | OUTPATIENT
Start: 2019-09-03 | End: 2019-09-03 | Stop reason: SURG

## 2019-09-03 RX ADMIN — PROPOFOL 40 MG: 10 INJECTION, EMULSION INTRAVENOUS at 13:31

## 2019-09-03 RX ADMIN — SODIUM CHLORIDE 125 ML/HR: 0.9 INJECTION, SOLUTION INTRAVENOUS at 12:35

## 2019-09-03 RX ADMIN — KETOROLAC TROMETHAMINE 30 MG: 30 INJECTION, SOLUTION INTRAMUSCULAR at 13:52

## 2019-09-03 RX ADMIN — FENTANYL CITRATE 25 MCG: 50 INJECTION, SOLUTION INTRAMUSCULAR; INTRAVENOUS at 13:38

## 2019-09-03 RX ADMIN — MIDAZOLAM 2 MG: 1 INJECTION INTRAMUSCULAR; INTRAVENOUS at 13:27

## 2019-09-03 RX ADMIN — ONDANSETRON 4 MG: 2 INJECTION INTRAMUSCULAR; INTRAVENOUS at 13:33

## 2019-09-03 RX ADMIN — SODIUM CHLORIDE 125 ML/HR: 0.9 INJECTION, SOLUTION INTRAVENOUS at 15:07

## 2019-09-03 RX ADMIN — FENTANYL CITRATE 25 MCG: 50 INJECTION, SOLUTION INTRAMUSCULAR; INTRAVENOUS at 13:44

## 2019-09-03 RX ADMIN — DEXAMETHASONE SODIUM PHOSPHATE 4 MG: 4 INJECTION, SOLUTION INTRAMUSCULAR; INTRAVENOUS at 13:33

## 2019-09-03 RX ADMIN — PROPOFOL 80 MCG/KG/MIN: 10 INJECTION, EMULSION INTRAVENOUS at 13:31

## 2019-09-03 NOTE — ANESTHESIA PREPROCEDURE EVALUATION
Review of Systems/Medical History  Patient summary reviewed  Chart reviewed  No history of anesthetic complications     Cardiovascular  Negative cardio ROS    Pulmonary  Pneumonia, Asthma , well controlled/ stable Last rescue: < 6 months ago Asthma type of rescue: PRN inhaler,        GI/Hepatic  Negative GI/hepatic ROS               Endo/Other  Negative endo/other ROS      GYN  Negative gynecology ROS          Hematology  Negative hematology ROS      Musculoskeletal  Rheumatoid arthritis Severity: moderate, Back pain , lumbar pain,   Arthritis     Neurology    Headaches,    Psychology   Negative psychology ROS              Physical Exam    Airway    Mallampati score: II  TM Distance: >3 FB  Neck ROM: full     Dental   No notable dental hx     Cardiovascular  Comment: Negative ROS, Rhythm: regular, Rate: normal, Cardiovascular exam normal    Pulmonary  Pulmonary exam normal Breath sounds clear to auscultation,     Other Findings        Anesthesia Plan  ASA Score- 2     Anesthesia Type- general and IV sedation with anesthesia with ASA Monitors  Additional Monitors:   Airway Plan:         Plan Factors-Patient not instructed to abstain from smoking on day of procedure  Patient did not smoke on day of surgery  Induction- intravenous  Postoperative Plan-     Informed Consent- Anesthetic plan and risks discussed with patient and spouse

## 2019-09-03 NOTE — OP NOTE
OPERATIVE REPORT  PATIENT NAME: Raymundo Zamudio    :  1970  MRN: [id number]  Pt Location: [facility] OR ROOM 03    SURGERY DATE: 9/3/2019    Surgeon(s) and Role:     * Radha Child DO - Primary     * Cecille Parsons MD - Assisting    Preop Diagnosis:  Irregular menses [N92 6]    Post-Op Diagnosis Codes:     * Irregular menses [N92 6]    Procedure(s) (LRB):  DILATATION AND CURETTAGE (D&C) WITH HYSTEROSCOPY (N/A)    Specimen(s):  ID Type Source Tests Collected by Time Destination   1 : endometrial curretting Tissue Endometrium TISSUE EXAM Radha Child DO 9/3/2019 1341        Estimated Blood Loss:   0 mL    Urine output 50cc    Hysteroscopic I 300cc    O 300cc  Anesthesia Type:   IV Sedation with Anesthesia  Paracervical block with 10cc 1% lidocaine    Operative Indications:  Irregular menses [N92 6]      Operative Findings:  Proliferative endometrium in the lower uterine segment  Uterus sounded to 9 5 cm  No adnexal masses      Complications:   None    Procedure and Technique:    Pt taken to the operating room and identified as Celester Locus  In the dorsal lithotomy position, she was sterilely prepped and draped  Timeout was performed  Bimanual was performed with the above findings  Her bladder was drained for 50 cc of clear urine  Weighted and Donte speculums were placed in the vagina and the anterior lip of the cervix was grasped with a tenaculum  Paracervical block was administered with 10cc of 1% lidocaine  The uterus was sounded and the cervix was dilated to accomodate the diagnostic hysteroscope  This was inserted into the endometrium and there was noted to be a small to moderate amount of tissue in the lower uterine segment  There was a smooth fundus  The hysteroscope was removed and the uterus was currettaged  In the usual fashion with a moderate amount of tissue obtained  There was no further tissue noted  At this time, all instruments were removed from the uterus, cervix and vagina    The cervix was hemostatic from the tenaculum  All sponge and instruments were correct x 2  The patient was awakened and taken to the PACU in good condition     I   I was present for the entire procedure    Patient Disposition:  PACU     SIGNATURE: Leticia Bustillo DO  DATE: September 3, 2019  TIME: 1:57 PM

## 2019-09-03 NOTE — DISCHARGE INSTRUCTIONS
Dilation and Curettage   WHAT YOU SHOULD KNOW:   Dilation and curettage (D and C) is a procedure to remove tissue from the lining of your uterus  INSTRUCTIONS:   Medicines:   · Pain medicine: You may be given medicine to take away or decrease pain  Do not wait until the pain is severe before you take your medicine  You can take tylenol and ibuprofen as needed for the pain  Do not exceed 4000mg of tylenol in one day, and take ibuprofen with food if possible  · Antibiotics: This medicine will help fight or prevent an infection  · Take your medicine as directed  Call your healthcare provider if you think your medicine is not helping or if you have side effects  Tell him if you are allergic to any medicine  Keep a list of the medicines, vitamins, and herbs you take  Include the amounts, and when and why you take them  Bring the list or the pill bottles to follow-up visits  Carry your medicine list with you in case of an emergency  Follow up with your healthcare provider as directed:  Write down your questions so you remember to ask them during your visits  Activity:  Ask your primary healthcare provider when you can return to your normal activities  Contact your primary healthcare provider if:   · You have foul-smelling vaginal discharge  · You do not get your monthly period  · You feel depressed or anxious  · You feel very tired and weak  · You have questions or concerns about your condition or care  Return to the emergency department if:   · You have heavy vaginal bleeding that soaks 1 pad in 1 hour for 2 hours in a row  · You have a fever and abdominal cramps  · Your pain does not get better, even after treatment  © 2014 [employer] is for End User's use only and may not be sold, redistributed or otherwise used for commercial purposes   All illustrations and images included in CareNotes® are the copyrighted property of A D A M , Inc  or [facility] [place], operated by [facility]  The above information is an  only  It is not intended as medical advice for individual conditions or treatments  Talk to your doctor, nurse or pharmacist before following any medical regimen to see if it is safe and effective for you

## 2019-09-03 NOTE — H&P
H&P Exam - Gynecology   Niru Wilkes 47 y o  female MRN: [id number]  Unit/Bed#:  Encounter: [id number]    Assessment/Plan     Addendum - reviewed the procedure with the patient  No changes in her medical history  Will proceed with D&C, hysteroscopy  Assessment:  Irregular menses  Irregularly thickened endometrium, possible polyp  Plan:  D&C, hysteroscopy for thorough sampling of her endometrium  She has had polyps in the past   D&C, hysteroscopy reviewed with pt in detail  Risks reviewed including bleeding, infection, 1% risk of uterine perforation with rare risk of injury to surrounding structures and need for further surgery  Post op course reviewed in detail as well including activity restrictions  Pt's questions were answered  Boris Arrow, DO    History of Present Illness   HX and PE limited by:   HPI:  Niru Wilkes is a 47 y o  female who presents with irregular menstrual cycles  Sonohysterogram showed a thickened endometrium, it was also irregular anteriorly  We discussed a D&C to thoroughly sample the area that was irregular  It was difficult to determine if there was an actual polyp there  She had a D&C few years ago to remove an endometrial polyp  She would like to proceed  Review of Systems   Constitutional: Negative  HENT: Negative  Eyes: Negative  Respiratory: Negative  Cardiovascular: Negative  Gastrointestinal: Negative  Endocrine: Negative  Genitourinary: Negative  Musculoskeletal: Negative  Skin: Negative  Allergic/Immunologic: Negative  Neurological: Negative  Hematological: Negative  Psychiatric/Behavioral: Negative  Historical Information   Past Medical History:   Diagnosis Date    Abnormal Pap smear of cervix     Ankylosing spondylitis (HCC)     Back pain     Lumbar    Fibroid uterus     Irregular menses     Joint pain     Has R  A     Migraine     Pneumonia     x1    RA [facility]rheumatoid arthritis) ([facility] [address] )     Seasonal allergies     Wears contact lenses     Wears glasses     Wears glasses      Past Surgical History:   Procedure Laterality Date    COLONOSCOPY      CT HYSTEROSCOPY,W/ENDO BX N/A 6/6/2017    Procedure: DILATATION AND CURETTAGE (D&C) WITH HYSTEROSCOPY;  Surgeon: Nanci Smith DO;  Location: [facility];  Service: Gynecology    TONSILLECTOMY      WISDOM TOOTH EXTRACTION       OB/GYN History: P3  Family History   Problem Relation Age of Onset    Hypertension Mother     Diabetes Mother     Stroke Mother     Hypertension Father     Stroke Father     Diabetes Daughter     No Known Problems Sister     No Known Problems Sister     Lymphoma Maternal Aunt     Breast cancer Maternal Aunt 60        due to lymphoma    No Known Problems Maternal Aunt     Diabetes Maternal Aunt     Hypertension Maternal Aunt     No Known Problems Maternal Aunt     No Known Problems Maternal Aunt     No Known Problems Maternal Aunt      Social History   Social History     Substance and Sexual Activity   Alcohol Use Yes    Frequency: Monthly or less    Drinks per session: 1 or 2    Binge frequency: Never    Comment: liquor     Social History     Substance and Sexual Activity   Drug Use No     Social History     Tobacco Use   Smoking Status Never Smoker   Smokeless Tobacco Never Used       Meds/Allergies   all current active meds have been reviewed  No Known Allergies    Objective   Vitals: Height 5' 6" (1 676 m), weight 81 6 kg (180 lb), last menstrual period 08/10/2019, not currently breastfeeding  No intake or output data in the 24 hours ending 09/03/19 1148    Invasive Devices: Invasive Devices     None                 Physical Exam   Cardiovascular: Normal rate and regular rhythm  Pulmonary/Chest: Effort normal and breath sounds normal        Lab Results: I have personally reviewed pertinent reports  Imaging: I have personally reviewed pertinent reports      EKG, Pathology, and Other Studies: I have personally reviewed pertinent reports  Code Status: No Order  Advance Directive and Living Will:      Power of :    POLST:      Counseling / Coordination of Care  Total floor / unit time spent today 10 minutes  Greater than 50% of total time was spent with the patient and / or family counseling and / or coordination of care  A description of the counseling / coordination of care: Damion Otto

## 2019-09-20 ENCOUNTER — OFFICE VISIT (OUTPATIENT)
Dept: OBGYN CLINIC | Facility: CLINIC | Age: 49
End: 2019-09-20
Payer: COMMERCIAL

## 2019-09-20 VITALS
WEIGHT: 184 LBS | HEIGHT: 66 IN | BODY MASS INDEX: 29.57 KG/M2 | SYSTOLIC BLOOD PRESSURE: 128 MMHG | DIASTOLIC BLOOD PRESSURE: 82 MMHG

## 2019-09-20 DIAGNOSIS — N92.6 IRREGULAR MENSES: Primary | ICD-10-CM

## 2019-09-20 PROCEDURE — 99213 OFFICE O/P EST LOW 20 MIN: CPT | Performed by: OBSTETRICS & GYNECOLOGY

## 2019-09-20 NOTE — PROGRESS NOTES
Assessment/Plan:     Status post D&C, hysteroscopy-she will keep a careful menstrual calendar  If the midcycle spotting resumes, this could be from hormonal changes and perimenopause  She could either observe this or we could cycle her with progesterone  If she would like progesterone, she will call the office  If her cycle spaces out her becomes lighter, she will also keep track this  Her questions were answered  There are no diagnoses linked to this encounter  Subjective:     Patient ID: Wallace Saucedo is a 47 y o  female  Patient here for postop visit  She is 2 weeks status post D&C hysteroscopy for irregular bleeding and finding on sonohysterogram of irregular endometrium  Pathology was normal with no sign of a polyp  She did quite well, she had light bleeding for about 4 days and minimal cramping  She has not yet had a menstrual cycle  Her  had a vasectomy  Review of Systems   Constitutional: Negative  Gastrointestinal: Negative  Genitourinary: Negative            Objective:     Physical Exam

## 2020-03-09 ENCOUNTER — TRANSCRIBE ORDERS (OUTPATIENT)
Dept: LAB | Facility: MEDICAL CENTER | Age: 50
End: 2020-03-09

## 2020-03-09 ENCOUNTER — APPOINTMENT (OUTPATIENT)
Dept: LAB | Facility: MEDICAL CENTER | Age: 50
End: 2020-03-09
Payer: COMMERCIAL

## 2020-03-09 DIAGNOSIS — M26.629: ICD-10-CM

## 2020-03-09 DIAGNOSIS — E55.9 VITAMIN D DEFICIENCY: ICD-10-CM

## 2020-03-09 DIAGNOSIS — R03.0 ELEVATED BP WITHOUT DIAGNOSIS OF HYPERTENSION: ICD-10-CM

## 2020-03-09 DIAGNOSIS — R60.9 EDEMA, UNSPECIFIED TYPE: ICD-10-CM

## 2020-03-09 DIAGNOSIS — I11.9 HYPERTENSIVE HEART DISEASE, UNSPECIFIED WHETHER HEART FAILURE PRESENT: ICD-10-CM

## 2020-03-09 DIAGNOSIS — I11.9 HYPERTENSIVE HEART DISEASE, UNSPECIFIED WHETHER HEART FAILURE PRESENT: Primary | ICD-10-CM

## 2020-03-09 LAB
25(OH)D3 SERPL-MCNC: 15.6 NG/ML (ref 30–100)
ALBUMIN SERPL BCP-MCNC: 4.1 G/DL (ref 3.5–5)
ALP SERPL-CCNC: 91 U/L (ref 46–116)
ALT SERPL W P-5'-P-CCNC: 21 U/L (ref 12–78)
ANION GAP SERPL CALCULATED.3IONS-SCNC: 4 MMOL/L (ref 4–13)
AST SERPL W P-5'-P-CCNC: 13 U/L (ref 5–45)
BASOPHILS # BLD AUTO: 0.07 THOUSANDS/ΜL (ref 0–0.1)
BASOPHILS NFR BLD AUTO: 1 % (ref 0–1)
BILIRUB SERPL-MCNC: 0.31 MG/DL (ref 0.2–1)
BUN SERPL-MCNC: 13 MG/DL (ref 5–25)
CALCIUM SERPL-MCNC: 9.2 MG/DL (ref 8.3–10.1)
CHLORIDE SERPL-SCNC: 109 MMOL/L (ref 100–108)
CHOLEST SERPL-MCNC: 195 MG/DL (ref 50–200)
CO2 SERPL-SCNC: 28 MMOL/L (ref 21–32)
CREAT SERPL-MCNC: 0.83 MG/DL (ref 0.6–1.3)
CRP SERPL QL: <3 MG/L
EOSINOPHIL # BLD AUTO: 0.25 THOUSAND/ΜL (ref 0–0.61)
EOSINOPHIL NFR BLD AUTO: 3 % (ref 0–6)
ERYTHROCYTE [DISTWIDTH] IN BLOOD BY AUTOMATED COUNT: 12.7 % (ref 11.6–15.1)
ERYTHROCYTE [SEDIMENTATION RATE] IN BLOOD: 23 MM/HOUR (ref 0–20)
GFR SERPL CREATININE-BSD FRML MDRD: 82 ML/MIN/1.73SQ M
GLUCOSE P FAST SERPL-MCNC: 84 MG/DL (ref 65–99)
HCT VFR BLD AUTO: 42 % (ref 34.8–46.1)
HDLC SERPL-MCNC: 48 MG/DL
HGB BLD-MCNC: 13.4 G/DL (ref 11.5–15.4)
IMM GRANULOCYTES # BLD AUTO: 0.02 THOUSAND/UL (ref 0–0.2)
IMM GRANULOCYTES NFR BLD AUTO: 0 % (ref 0–2)
LDLC SERPL CALC-MCNC: 126 MG/DL (ref 0–100)
LYMPHOCYTES # BLD AUTO: 1.81 THOUSANDS/ΜL (ref 0.6–4.47)
LYMPHOCYTES NFR BLD AUTO: 25 % (ref 14–44)
MCH RBC QN AUTO: 29.1 PG (ref 26.8–34.3)
MCHC RBC AUTO-ENTMCNC: 31.9 G/DL (ref 31.4–37.4)
MCV RBC AUTO: 91 FL (ref 82–98)
MONOCYTES # BLD AUTO: 0.5 THOUSAND/ΜL (ref 0.17–1.22)
MONOCYTES NFR BLD AUTO: 7 % (ref 4–12)
NEUTROPHILS # BLD AUTO: 4.68 THOUSANDS/ΜL (ref 1.85–7.62)
NEUTS SEG NFR BLD AUTO: 64 % (ref 43–75)
NONHDLC SERPL-MCNC: 147 MG/DL
NRBC BLD AUTO-RTO: 0 /100 WBCS
PLATELET # BLD AUTO: 247 THOUSANDS/UL (ref 149–390)
PMV BLD AUTO: 10.4 FL (ref 8.9–12.7)
POTASSIUM SERPL-SCNC: 4.1 MMOL/L (ref 3.5–5.3)
PROT SERPL-MCNC: 8.2 G/DL (ref 6.4–8.2)
RBC # BLD AUTO: 4.61 MILLION/UL (ref 3.81–5.12)
SODIUM SERPL-SCNC: 141 MMOL/L (ref 136–145)
T4 FREE SERPL-MCNC: 0.93 NG/DL (ref 0.76–1.46)
TRIGL SERPL-MCNC: 104 MG/DL
TSH SERPL DL<=0.05 MIU/L-ACNC: 2.9 UIU/ML (ref 0.36–3.74)
WBC # BLD AUTO: 7.33 THOUSAND/UL (ref 4.31–10.16)

## 2020-03-09 PROCEDURE — 86038 ANTINUCLEAR ANTIBODIES: CPT

## 2020-03-09 PROCEDURE — 80061 LIPID PANEL: CPT

## 2020-03-09 PROCEDURE — 86140 C-REACTIVE PROTEIN: CPT

## 2020-03-09 PROCEDURE — 82306 VITAMIN D 25 HYDROXY: CPT

## 2020-03-09 PROCEDURE — 84439 ASSAY OF FREE THYROXINE: CPT

## 2020-03-09 PROCEDURE — 80053 COMPREHEN METABOLIC PANEL: CPT

## 2020-03-09 PROCEDURE — 85652 RBC SED RATE AUTOMATED: CPT

## 2020-03-09 PROCEDURE — 85025 COMPLETE CBC W/AUTO DIFF WBC: CPT

## 2020-03-09 PROCEDURE — 84443 ASSAY THYROID STIM HORMONE: CPT

## 2020-03-09 PROCEDURE — 36415 COLL VENOUS BLD VENIPUNCTURE: CPT

## 2020-03-11 LAB — RYE IGE QN: NEGATIVE

## 2020-06-24 ENCOUNTER — APPOINTMENT (OUTPATIENT)
Dept: LAB | Facility: MEDICAL CENTER | Age: 50
End: 2020-06-24
Payer: COMMERCIAL

## 2020-06-24 ENCOUNTER — TRANSCRIBE ORDERS (OUTPATIENT)
Dept: LAB | Facility: MEDICAL CENTER | Age: 50
End: 2020-06-24

## 2020-06-24 DIAGNOSIS — E55.9 VITAMIN D DEFICIENCY: Primary | ICD-10-CM

## 2020-06-24 DIAGNOSIS — E55.9 VITAMIN D DEFICIENCY: ICD-10-CM

## 2020-06-24 LAB — 25(OH)D3 SERPL-MCNC: 28.3 NG/ML (ref 30–100)

## 2020-06-24 PROCEDURE — 36415 COLL VENOUS BLD VENIPUNCTURE: CPT

## 2020-06-24 PROCEDURE — 82306 VITAMIN D 25 HYDROXY: CPT

## 2020-08-04 ENCOUNTER — EVALUATION (OUTPATIENT)
Dept: PHYSICAL THERAPY | Facility: CLINIC | Age: 50
End: 2020-08-04
Payer: COMMERCIAL

## 2020-08-04 VITALS — SYSTOLIC BLOOD PRESSURE: 144 MMHG | HEART RATE: 89 BPM | DIASTOLIC BLOOD PRESSURE: 92 MMHG

## 2020-08-04 DIAGNOSIS — M25.512 ACUTE PAIN OF LEFT SHOULDER: Primary | ICD-10-CM

## 2020-08-04 DIAGNOSIS — M75.02 ADHESIVE CAPSULITIS OF LEFT SHOULDER: ICD-10-CM

## 2020-08-04 PROCEDURE — 97140 MANUAL THERAPY 1/> REGIONS: CPT | Performed by: PHYSICAL THERAPIST

## 2020-08-04 PROCEDURE — 97161 PT EVAL LOW COMPLEX 20 MIN: CPT | Performed by: PHYSICAL THERAPIST

## 2020-08-04 PROCEDURE — 97110 THERAPEUTIC EXERCISES: CPT | Performed by: PHYSICAL THERAPIST

## 2020-08-04 NOTE — PROGRESS NOTES
PT Evaluation     Today's date: 2020  Patient name: Lauren Bustillo  : 1970  MRN: [id number]  Referring provider: Edwar Villagomez MD  Dx:   Encounter Diagnosis     ICD-10-CM    1  Acute pain of left shoulder  M25 512    2  Adhesive capsulitis of left shoulder  M75 02                   Assessment  Assessment details: Lauren Bustillo is a 47 y o  female who presents with complaints of acute L shoulder pain  No further referral appears necessary at this time based upon examination results  Presents with s/s consistent with referring dx of adhesive capsulitis of L shoulder  Presents with loss of GHJ movement into abduction, ER and IR greatest, but also loss of flexion and extension at shoulder  Prognosis is good given HEP compliance and PT 2-3x/wk  Please contact me if you have any questions or recommendations  Thank you for the opportunity to share in  Jody's care  Impairments: abnormal or restricted ROM, activity intolerance, impaired physical strength, lacks appropriate home exercise program, pain with function and poor posture   Functional limitations: painful reaching overhead, behind the back, behind the head, painful liftingUnderstanding of Dx/Px/POC: good   Prognosis: good    Goals  STGs  1) In 4-6 weeks patient will report reduction in pain level to <6/10 at worst  2) In 4-6 weeks patient will demonstrate improved shoulder ROM 15 deg or more in each direction  3) In 4-6 weeks patient will demonstrate improved posture in sitting    LTGs  1) In 6-12 weeks patient will demonstrate return to shoulder ROM WFL  2) In 6-12 weeks patient will report no difficulty with lifting overhead  3) In 6-12 weeks patient will demonstrate independence with HEP       Plan  Patient would benefit from: skilled physical therapy  Referral necessary: No  Planned modality interventions: cryotherapy, TENS, unattended electrical stimulation, ultrasound and thermotherapy: hydrocollator packs  Planned therapy interventions: manual therapy, joint mobilization, massage, Evans taping, neuromuscular re-education, patient education, postural training, self care, strengthening, stretching, therapeutic activities, therapeutic exercise, home exercise program, flexibility and functional ROM exercises  Frequency: 3x week  Duration in weeks: 12  Plan of Care beginning date: 2020  Plan of Care expiration date: 10/27/2020  Treatment plan discussed with: PTA and patient        Subjective Evaluation    History of Present Illness  Mechanism of injury: Patient presents to PT with c/o L shoulder pain since February  No specific injury but she does remember "pulling" something in the arm when tossing something into the back seat of her car  She did have massage therapy since February and reports having severe pain when the therapist "touches the bursa " She went to massage therapy for a total of 3 visits  She has had several medications that she has not noticed significant improvement with medication  She has tried ice and heat, both can help to reduce the pain but nothing takes it away  She reports she helps to take care of her elderly parents  Patient has not had any injections thus far and has not had any PT for this in the past  Patient had xray but no other imaging at this time  Pain is localized to L upper arm but can radiate into her neck and shoulder blade at times  Denies N/T into UE      Pain  Current pain ratin  At best pain ratin  At worst pain rating: 10  Quality: sharp  Relieving factors: ice and heat    Social Support    Employment status: working (desk work)  Hand dominance: right      Diagnostic Tests  X-ray: normal  Treatments  Previous treatment: massage  Patient Goals  Patient goals for therapy: increased motion and decreased pain          Objective     Active Range of Motion   Left Shoulder   Flexion: 110 degrees with pain  Extension: 40 degrees with pain  Abduction: 75 degrees with pain  External rotation BTH: C4 with pain  Internal rotation BTB: sacrum with pain  Horizontal abduction: 80 degrees with pain  Horizontal adduction: 10 degrees with pain    Passive Range of Motion   Left Shoulder   Flexion: 100 degrees with pain  Abduction: 55 degrees with pain  External rotation 45°: 15 degrees with pain  Internal rotation 45°: 35 degrees with pain      Flowsheet Rows      Most Recent Value   PT/OT G-Codes   Current Score  50   Projected Score  68             Precautions: none  POC exp 10/27/2020  Manuals 8/4            L shoulder ROM x            Posterior capsule stretch x                         Total time x15 mins            Neuro Re-Ed             Scap retractions             Cervical retractions             Postural correction with lumbar roll                                                                 Ther Ex             Supine AAROM w/cane flexion and scaption  x10 ea, 5" holds, instructed for HEP                                      UBE             Pulleys             Finger ladder             Wall slides ITY             Doorway pec stretch             Thoracic extension over chair             Posterior capsule sleeper stretch                          Ther Activity                                                                              Modalities             MHP to L shoulder prior to stretch x10 mins                           Patient was provided a home exercise program and demonstrated an understanding of exercises  Patient was advised to stop performing home exercise program if symptoms increase or new complaints developed  Verbal understanding demonstrated regarding home exercise program instructions

## 2020-08-04 NOTE — LETTER
2020    MD Sandie Man [address] [facility]    Patient: Eric Pereira   YOB: 1970   Date of Visit: 2020     Encounter Diagnosis     ICD-10-CM    1  Acute pain of left shoulder  M25 512    2  Adhesive capsulitis of left shoulder  M75 02        Dear Dr Kristal Kirby: Thank you for your recent referral of Eric Pereira  Please review the attached evaluation summary from Jody's recent visit  Please verify that you agree with the plan of care by signing the attached order  If you have any questions or concerns, please do not hesitate to call  I sincerely appreciate the opportunity to share in the care of one of your patients and hope to have another opportunity to work with you in the near future  Sincerely,    Isaias Carnes, PT      Referring Provider:      I certify that I have read the below Plan of Care and certify the need for these services furnished under this plan of treatment while under my care  MD Sandie Man [address]: [phone]          PT Evaluation     Today's date: 2020  Patient name: Eric Pereira  : 1970  MRN: [id number]  Referring provider: Curly Crabtree MD  Dx:   Encounter Diagnosis     ICD-10-CM    1  Acute pain of left shoulder  M25 512    2  Adhesive capsulitis of left shoulder  M75 02                   Assessment  Assessment details: Eric Pereira is a 47 y o  female who presents with complaints of acute L shoulder pain  No further referral appears necessary at this time based upon examination results  Presents with s/s consistent with referring dx of adhesive capsulitis of L shoulder  Presents with loss of GHJ movement into abduction, ER and IR greatest, but also loss of flexion and extension at shoulder  Prognosis is good given HEP compliance and PT 2-3x/wk  Please contact me if you have any questions or recommendations  Thank you for the opportunity to share in  Jody's care  Impairments: abnormal or restricted ROM, activity intolerance, impaired physical strength, lacks appropriate home exercise program, pain with function and poor posture   Functional limitations: painful reaching overhead, behind the back, behind the head, painful liftingUnderstanding of Dx/Px/POC: good   Prognosis: good    Goals  STGs  1) In 4-6 weeks patient will report reduction in pain level to <6/10 at worst  2) In 4-6 weeks patient will demonstrate improved shoulder ROM 15 deg or more in each direction  3) In 4-6 weeks patient will demonstrate improved posture in sitting    LTGs  1) In 6-12 weeks patient will demonstrate return to shoulder ROM WFL  2) In 6-12 weeks patient will report no difficulty with lifting overhead  3) In 6-12 weeks patient will demonstrate independence with HEP  Plan  Patient would benefit from: skilled physical therapy  Referral necessary: No  Planned modality interventions: cryotherapy, TENS, unattended electrical stimulation, ultrasound and thermotherapy: hydrocollator packs  Planned therapy interventions: manual therapy, joint mobilization, massage, Evans taping, neuromuscular re-education, patient education, postural training, self care, strengthening, stretching, therapeutic activities, therapeutic exercise, home exercise program, flexibility and functional ROM exercises  Frequency: 3x week  Duration in weeks: 12  Plan of Care beginning date: 8/4/2020  Plan of Care expiration date: 10/27/2020  Treatment plan discussed with: PTA and patient        Subjective Evaluation    History of Present Illness  Mechanism of injury: Patient presents to PT with c/o L shoulder pain since February  No specific injury but she does remember "pulling" something in the arm when tossing something into the back seat of her car   She did have massage therapy since February and reports having severe pain when the therapist "touches the bursa " She went to massage therapy for a total of 3 visits  She has had several medications that she has not noticed significant improvement with medication  She has tried ice and heat, both can help to reduce the pain but nothing takes it away  She reports she helps to take care of her elderly parents  Patient has not had any injections thus far and has not had any PT for this in the past  Patient had xray but no other imaging at this time  Pain is localized to L upper arm but can radiate into her neck and shoulder blade at times  Denies N/T into UE      Pain  Current pain ratin  At best pain ratin  At worst pain rating: 10  Quality: sharp  Relieving factors: ice and heat    Social Support    Employment status: working (desk work)  Hand dominance: right      Diagnostic Tests  X-ray: normal  Treatments  Previous treatment: massage  Patient Goals  Patient goals for therapy: increased motion and decreased pain          Objective     Active Range of Motion   Left Shoulder   Flexion: 110 degrees with pain  Extension: 40 degrees with pain  Abduction: 75 degrees with pain  External rotation BTH: C4 with pain  Internal rotation BTB: sacrum with pain  Horizontal abduction: 80 degrees with pain  Horizontal adduction: 10 degrees with pain    Passive Range of Motion   Left Shoulder   Flexion: 100 degrees with pain  Abduction: 55 degrees with pain  External rotation 45°: 15 degrees with pain  Internal rotation 45°: 35 degrees with pain      Flowsheet Rows      Most Recent Value   PT/OT G-Codes   Current Score  50   Projected Score  68             Precautions: none  POC exp 10/27/2020  Manuals 8/4            L shoulder ROM x            Posterior capsule stretch x                         Total time x15 mins            Neuro Re-Ed             Scap retractions             Cervical retractions             Postural correction with lumbar roll                                                                 Ther Ex Supine AAROM w/cane flexion and scaption  x10 ea, 5" holds, instructed for HEP                                      UBE             Pulleys             Finger ladder             Wall slides ITY             Doorway pec stretch             Thoracic extension over chair             Posterior capsule sleeper stretch                          Ther Activity                                                                              Modalities             MHP to L shoulder prior to stretch x10 mins                           Patient was provided a home exercise program and demonstrated an understanding of exercises  Patient was advised to stop performing home exercise program if symptoms increase or new complaints developed  Verbal understanding demonstrated regarding home exercise program instructions

## 2020-08-05 ENCOUNTER — TRANSCRIBE ORDERS (OUTPATIENT)
Dept: PHYSICAL THERAPY | Facility: CLINIC | Age: 50
End: 2020-08-05

## 2020-08-05 DIAGNOSIS — M25.512 CHRONIC LEFT SHOULDER PAIN: Primary | ICD-10-CM

## 2020-08-05 DIAGNOSIS — G89.29 CHRONIC LEFT SHOULDER PAIN: Primary | ICD-10-CM

## 2020-08-10 ENCOUNTER — OFFICE VISIT (OUTPATIENT)
Dept: PHYSICAL THERAPY | Facility: CLINIC | Age: 50
End: 2020-08-10
Payer: COMMERCIAL

## 2020-08-10 DIAGNOSIS — M75.02 ADHESIVE CAPSULITIS OF LEFT SHOULDER: ICD-10-CM

## 2020-08-10 DIAGNOSIS — M25.512 ACUTE PAIN OF LEFT SHOULDER: Primary | ICD-10-CM

## 2020-08-10 PROCEDURE — 97110 THERAPEUTIC EXERCISES: CPT

## 2020-08-10 PROCEDURE — 97140 MANUAL THERAPY 1/> REGIONS: CPT

## 2020-08-10 NOTE — PROGRESS NOTES
Daily Note     Today's date: 8/10/2020  Patient name: Esvin Saul  : 1970  MRN: [id number]  Referring provider: Elizabeth Ferrer MD  Dx:   Encounter Diagnosis     ICD-10-CM    1  Acute pain of left shoulder  M25 512    2  Adhesive capsulitis of left shoulder  M75 02                   Subjective: Pt reports that her shoulder is always worse at night, after work and before she goes to bed  Objective: See treatment diary below      Assessment: Tolerated treatment well  Pt has no adverse affect to MHP prior ro manuals  Pt limited in PROM of the L shoulder secondary to pain  Initiated multiple exercises and self stretches as outline in pt POC  Patient demonstrated fatigue post treatment and would benefit from continued PT      Plan: Continue per plan of care        Precautions: none  POC exp 10/27/2020  Manuals 8/4 8/10      L shoulder ROM x x      Posterior capsule stretch x x        (MHP prior to stretching)      Total time x15 mins x15 min      Neuro Re-Ed        Scap retractions        Cervical retractions        Postural correction with lumbar roll                                        Ther Ex        Supine AAROM w/cane flexion and scaption  x10 ea, 5" holds, instructed for HEP                       UBE  120 RPM 5' fwd/ 5' retro       Pulleys  NV      Finger ladder  5" x10 Flex      Wall slides ITY  3" x10 ea      Doorway pec stretch  30" x3      Thoracic extension over chair  5" x10      Posterior capsule sleeper stretch                Ther Activity                                                Modalities        MHP to L shoulder prior to stretch x10 mins x10 min

## 2020-08-14 ENCOUNTER — OFFICE VISIT (OUTPATIENT)
Dept: PHYSICAL THERAPY | Facility: CLINIC | Age: 50
End: 2020-08-14
Payer: COMMERCIAL

## 2020-08-14 DIAGNOSIS — M75.02 ADHESIVE CAPSULITIS OF LEFT SHOULDER: ICD-10-CM

## 2020-08-14 DIAGNOSIS — M25.512 ACUTE PAIN OF LEFT SHOULDER: Primary | ICD-10-CM

## 2020-08-14 PROCEDURE — 97140 MANUAL THERAPY 1/> REGIONS: CPT

## 2020-08-14 PROCEDURE — 97110 THERAPEUTIC EXERCISES: CPT

## 2020-08-14 NOTE — PROGRESS NOTES
Daily Note     Today's date: 2020  Patient name: Puneet Zambrano  : 1970  MRN: [id number]  Referring provider: Andrae Bolaños MD  Dx:   Encounter Diagnosis     ICD-10-CM    1  Acute pain of left shoulder  M25 512    2  Adhesive capsulitis of left shoulder  M75 02                   Subjective: Pt reports that she was very sore after her first Tx  She c/o posterior shoulder pain when using her arm  Objective: See treatment diary below      Assessment: Tolerated treatment well  Initiated the pulleys to increase AAROM  MHP applied to the L shoulder prior to manuals with no adverse affect  Pt continues to have pain during an ABD stretch  Patient demonstrated fatigue post treatment and would benefit from continued PT      Plan: Continue per plan of care        Precautions: none  POC exp 10/27/2020  Manuals 8/4 8/10 8/14     L shoulder ROM x x x     Posterior capsule stretch x x x       (MHP prior to stretching) (MHP prior to stretching)     Total time x15 mins x15 min x15 min     Neuro Re-Ed        Scap retractions        Cervical retractions        Postural correction with lumbar roll                                        Ther Ex        Supine AAROM w/cane flexion and scaption  x10 ea, 5" holds, instructed for HEP                       UBE  120 RPM 5' fwd/ 5' retro  120 RPM 5' fwd/ 5' retro      Pulleys  NV 10" x10 Flex/Scap     Finger ladder  5" x10 Flex 5" x10 Flex     Wall slides ITY  3" x10 ea 3" x10 ea     Doorway pec stretch  30" x3 30" x3     Thoracic extension over chair  5" x10 5" x10     Posterior capsule sleeper stretch                Ther Activity                                                Modalities        MHP to L shoulder prior to stretch x10 mins x10 min x10 min

## 2020-08-18 ENCOUNTER — OFFICE VISIT (OUTPATIENT)
Dept: PHYSICAL THERAPY | Facility: CLINIC | Age: 50
End: 2020-08-18
Payer: COMMERCIAL

## 2020-08-18 DIAGNOSIS — M25.512 ACUTE PAIN OF LEFT SHOULDER: Primary | ICD-10-CM

## 2020-08-18 DIAGNOSIS — M75.02 ADHESIVE CAPSULITIS OF LEFT SHOULDER: ICD-10-CM

## 2020-08-18 PROCEDURE — 97140 MANUAL THERAPY 1/> REGIONS: CPT

## 2020-08-18 PROCEDURE — 97110 THERAPEUTIC EXERCISES: CPT

## 2020-08-18 NOTE — PROGRESS NOTES
Daily Note     Today's date: 2020  Patient name: Mony David  : 1970  MRN: [id number]  Referring provider: Army Edgar MD  Dx:   Encounter Diagnosis     ICD-10-CM    1  Acute pain of left shoulder  M25 512    2  Adhesive capsulitis of left shoulder  M75 02                   Subjective: Pt reports that her shoulder feel good today, She reports that her shoulder is always better in the evenings  Objective: See treatment diary below      Assessment: Tolerated treatment well  Pt has no adverse effect to MHP prior to manuals  Pt has increased PROM of the L shoulder this date  Initiated multiple exercises to facilitate LUE strength  Patient demonstrated fatigue post treatment and would benefit from continued PT      Plan: Continue per plan of care        Precautions: none  POC exp 10/27/2020  Manuals 8/4 8/10 8/14 8/18    L shoulder ROM x x x x    Posterior capsule stretch x x x x      (MHP prior to stretching) (MHP prior to stretching) (MHP prior to stretching)    Total time x15 mins x15 min x15 min x15 min    Neuro Re-Ed        Scap retractions        Cervical retractions        Postural correction with lumbar roll                                        Ther Ex        Supine AAROM w/cane flexion and scaption  x10 ea, 5" holds, instructed for HEP       MTP/LTP    Green TB 5" x10 ea    B/L ER w/ TB @ wall    Green TB 5" x10 ea    UBE  120 RPM 5' fwd/ 5' retro  120 RPM 5' fwd/ 5' retro  120 RPM 5' fwd/ 5' retro     Pulleys  NV 10" x10 Flex/Scap 10" x10 Flex/Scap    Finger ladder  5" x10 Flex 5" x10 Flex 5" x10 Flex    Wall slides ITY  3" x10 ea 3" x10 ea 3" x10 ea    Doorway pec stretch  30" x3 30" x3 30" 3 ea    Thoracic extension over chair  5" x10 5" x10 5" x10 ea    Posterior capsule sleeper stretch    5" x10    AAROM Flex supine w/ cane    5" x10    AAROM ER supine w/ cane     5" x10    AROM Flex to 90*    2# 5" x10    (Consider prone ITY on pball NV) Modalities        MHP to L shoulder prior to stretch x10 mins x10 min x10 min x10 min

## 2020-08-21 ENCOUNTER — APPOINTMENT (OUTPATIENT)
Dept: PHYSICAL THERAPY | Facility: CLINIC | Age: 50
End: 2020-08-21
Payer: COMMERCIAL

## 2020-08-24 ENCOUNTER — OFFICE VISIT (OUTPATIENT)
Dept: PHYSICAL THERAPY | Facility: CLINIC | Age: 50
End: 2020-08-24
Payer: COMMERCIAL

## 2020-08-24 DIAGNOSIS — M25.512 ACUTE PAIN OF LEFT SHOULDER: Primary | ICD-10-CM

## 2020-08-24 DIAGNOSIS — M75.02 ADHESIVE CAPSULITIS OF LEFT SHOULDER: ICD-10-CM

## 2020-08-24 PROCEDURE — 97140 MANUAL THERAPY 1/> REGIONS: CPT | Performed by: PHYSICAL THERAPIST

## 2020-08-24 PROCEDURE — 97110 THERAPEUTIC EXERCISES: CPT | Performed by: PHYSICAL THERAPIST

## 2020-08-24 NOTE — PROGRESS NOTES
Daily Note     Today's date: 2020  Patient name: Milind Heredia  : 1970  MRN: [id number]  Referring provider: Daren Melendez MD  Dx:   Encounter Diagnosis     ICD-10-CM    1  Acute pain of left shoulder  M25 512    2  Adhesive capsulitis of left shoulder  M75 02                   Subjective: Patient reports she is noticing she is getting better with some movements, others are about the same  Objective: See treatment diary below      Assessment: Tolerated treatment well  Tolerating manuals well  Remains limited in all directions but is making steady improvements  Patient demonstrated fatigue post treatment, exhibited good technique with therapeutic exercises and would benefit from continued PT      Plan: Continue per plan of care  Progress treatment as tolerated         Precautions: none  POC exp 10/27/2020  Manuals 8/4 8/10 8/14 8/18 8/24   L shoulder ROM x x x x x   Posterior capsule stretch x x x x x     (MHP prior to stretching) (MHP prior to stretching) (MHP prior to stretching) MHP prior to stretching   Total time x15 mins x15 min x15 min x15 min x15 mins   Neuro Re-Ed        Scap retractions        Cervical retractions        Postural correction with lumbar roll                                        Ther Ex        Supine AAROM w/cane flexion and scaption  x10 ea, 5" holds, instructed for HEP       MTP/LTP    Green TB 5" x10 ea Resume NV   B/L ER w/ TB @ wall    Green TB 5" x10 ea Resume NV   UBE  120 RPM 5' fwd/ 5' retro  120 RPM 5' fwd/ 5' retro  120 RPM 5' fwd/ 5' retro  120 RPM 5' fwd/ 5' retro    Pulleys  NV 10" x10 Flex/Scap 10" x10 Flex/Scap 10" x10 Flex/Scap   Finger ladder  5" x10 Flex 5" x10 Flex 5" x10 Flex 5" x10 Flex   Wall slides ITY  3" x10 ea 3" x10 ea 3" x10 ea 3" x10 ea   Doorway pec stretch  30" x3 30" x3 30" 3 ea 30" 3 ea   Thoracic extension over chair  5" x10 5" x10 5" x10 ea 5" x10 ea   Posterior capsule sleeper stretch    5" x10 5" x10   AAROM Flex supine w/ cane 5" x10 5" x10   AAROM ER supine w/ cane     5" x10 5" x10   AROM Flex to 90*    2# 5" x10    (Consider prone ITY on pball NV) 2# 5" x10                                           Modalities        MHP to L shoulder prior to stretch x10 mins x10 min x10 min x10 min x10 mins

## 2020-08-27 ENCOUNTER — OFFICE VISIT (OUTPATIENT)
Dept: PHYSICAL THERAPY | Facility: CLINIC | Age: 50
End: 2020-08-27
Payer: COMMERCIAL

## 2020-08-27 DIAGNOSIS — M25.512 ACUTE PAIN OF LEFT SHOULDER: Primary | ICD-10-CM

## 2020-08-27 DIAGNOSIS — M75.02 ADHESIVE CAPSULITIS OF LEFT SHOULDER: ICD-10-CM

## 2020-08-27 PROCEDURE — 97140 MANUAL THERAPY 1/> REGIONS: CPT | Performed by: PHYSICAL THERAPIST

## 2020-08-27 PROCEDURE — 97110 THERAPEUTIC EXERCISES: CPT | Performed by: PHYSICAL THERAPIST

## 2020-08-27 NOTE — PROGRESS NOTES
Daily Note     Today's date: 2020  Patient name: Rusty Russell  : 1970  MRN: [id number]  Referring provider: Hayden Landry MD  Dx:   Encounter Diagnosis     ICD-10-CM    1  Acute pain of left shoulder  M25 512    2  Adhesive capsulitis of left shoulder  M75 02                   Subjective: Patient reports she is able to sleep better through the night  Objective: See treatment diary below      Assessment: Pt continued to have firm end feel in all directions and is presenting with capsular pattern  Continue to progress capsular stretching to improve her overall mobility  Instructed patient to break up stretching session to 5-6 times t/o the day in order to prevent her shoulder from tightening up more t/o the day  Pt verbalized and demonstrated understanding  Plan: Continue per plan of care  Progress treatment as tolerated         Precautions: none  POC exp 10/27/2020  Manuals 8/27 8/10 8/14 8/18 8/24   L shoulder ROM End range capsular stretch x  x x x x   Posterior capsule stretch x x x x x     (MHP prior to stretching) (MHP prior to stretching) (MHP prior to stretching) MHP prior to stretching   Total time  x15 min x15 min x15 min x15 mins   Neuro Re-Ed        Scap retractions        Cervical retractions        Postural correction with lumbar roll                                        Ther Ex        Supine AAROM w/cane        MTP/LTP Green TB 5" 2x10 ea   Green TB 5" x10 ea Resume NV   B/L ER w/ TB @ wall Green TB 5" x10   Green TB 5" x10 ea Resume NV    RPM 5' fwd/ 5' retro  120 RPM 5' fwd/ 5' retro  120 RPM 5' fwd/ 5' retro  120 RPM 5' fwd/ 5' retro  120 RPM 5' fwd/ 5' retro    Pulleys 10" x10 Flex/Scap NV 10" x10 Flex/Scap 10" x10 Flex/Scap 10" x10 Flex/Scap   Finger ladder 5" x10 Flex 5" x10 Flex 5" x10 Flex 5" x10 Flex 5" x10 Flex   Wall slides ITY 3" x10 ea 3" x10 ea 3" x10 ea 3" x10 ea 3" x10 ea   Doorway pec stretch 30" 3 ea 30" x3 30" x3 30" 3 ea 30" 3 ea   Thoracic extension over chair 5" x10 5" x10 5" x10 5" x10 ea 5" x10 ea   Posterior capsule sleeper stretch 5" x10   5" x10 5" x10   AAROM Flex supine w/ cane 5" x10   5" x10 5" x10   AAROM ER supine w/ cane 5" x10    5" x10 5" x10   AROM Flex to 90* 2# 5" x10   2# 5" x10    (Consider prone ITY on pball NV) 2# 5" x10                                           Modalities        MHP to L shoulder prior to stretch x10 min x10 min x10 min x10 min x10 mins

## 2020-09-01 ENCOUNTER — EVALUATION (OUTPATIENT)
Dept: PHYSICAL THERAPY | Facility: CLINIC | Age: 50
End: 2020-09-01
Payer: COMMERCIAL

## 2020-09-01 DIAGNOSIS — M25.512 ACUTE PAIN OF LEFT SHOULDER: Primary | ICD-10-CM

## 2020-09-01 DIAGNOSIS — M75.02 ADHESIVE CAPSULITIS OF LEFT SHOULDER: ICD-10-CM

## 2020-09-01 PROCEDURE — 97140 MANUAL THERAPY 1/> REGIONS: CPT | Performed by: PHYSICAL THERAPIST

## 2020-09-01 PROCEDURE — 97110 THERAPEUTIC EXERCISES: CPT | Performed by: PHYSICAL THERAPIST

## 2020-09-01 NOTE — PROGRESS NOTES
PT Progress Note     Today's date: 2020  Patient name: Naseem Mars  : 1970  MRN: [id number]  Referring provider: Carissa Cerrato MD  Dx:   Encounter Diagnosis     ICD-10-CM    1  Acute pain of left shoulder  M25 512    2  Adhesive capsulitis of left shoulder  M75 02                   Assessment  Assessment details: Naseem Mars has been compliant with PT services  She has attended a total of 7 visits thus far and has demonstrated decreased pain, increased strength, increased range of motion, and increased activity tolerance since starting physical therapy services  She reports an overall improvement of 65% thus far  She is making steady progress toward goals  ROM has improved but is still limited, most limited into abduction and IR/ER  She continues to present with pain, decreased strength, decreased range of motion, and decreased activity tolerance and would benefit from additional skilled physical therapy interventions to address impairments and maximize function  Impairments: abnormal or restricted ROM, activity intolerance, impaired physical strength, lacks appropriate home exercise program, pain with function and poor posture   Functional limitations: painful reaching overhead, behind the back, behind the head, painful liftingUnderstanding of Dx/Px/POC: good   Prognosis: good    Goals  STGs  1) In 4-6 weeks patient will report reduction in pain level to <6/10 at worst- MET  2) In 4-6 weeks patient will demonstrate improved shoulder ROM 15 deg or more in each direction - partially met (not met IR/ER)  3) In 4-6 weeks patient will demonstrate improved posture in sitting - MET    LTGs  1) In 6-12 weeks patient will demonstrate return to shoulder ROM WFL  2) In 6-12 weeks patient will report no difficulty with lifting overhead  3) In 6-12 weeks patient will demonstrate independence with HEP       Plan  Patient would benefit from: skilled physical therapy  Referral necessary: No  Planned modality interventions: cryotherapy, TENS, unattended electrical stimulation, ultrasound and thermotherapy: hydrocollator packs  Planned therapy interventions: manual therapy, joint mobilization, massage, Evans taping, neuromuscular re-education, patient education, postural training, self care, strengthening, stretching, therapeutic activities, therapeutic exercise, home exercise program, flexibility and functional ROM exercises  Frequency: 3x week  Duration in weeks: 8  Plan of Care beginning date: 2020  Plan of Care expiration date: 10/27/2020  Treatment plan discussed with: PTA and patient        Subjective Evaluation    History of Present Illness  Mechanism of injury: Patient presents to PT with c/o L shoulder pain since February  No specific injury but she does remember "pulling" something in the arm when tossing something into the back seat of her car  She did have massage therapy since February and reports having severe pain when the therapist "touches the bursa " She went to massage therapy for a total of 3 visits  She has had several medications that she has not noticed significant improvement with medication  She has tried ice and heat, both can help to reduce the pain but nothing takes it away  She reports she helps to take care of her elderly parents  Patient has not had any injections thus far and has not had any PT for this in the past  Patient had xray but no other imaging at this time  Pain is localized to L upper arm but can radiate into her neck and shoulder blade at times  Denies N/T into UE  Progress Note 20:  Patient reports she feels at least 65% better at this time  She still has discomfort but is not constantly in pain  She reports she still has difficulty reaching behind her lower back and out from her side     Pain  Current pain ratin  At best pain ratin  At worst pain ratin  Quality: sharp  Relieving factors: ice and heat    Social Support    Employment status: working (desk work)  Hand dominance: right      Diagnostic Tests  X-ray: normal  Treatments  Previous treatment: massage  Patient Goals  Patient goals for therapy: increased motion and decreased pain          Objective     Active Range of Motion   Left Shoulder   Flexion: 128 degrees with pain  Extension: 50 degrees with pain  Abduction: 105 degrees with pain  External rotation BTH: T1 with pain  Internal rotation BTB: sacrum with pain  Horizontal abduction: 80 degrees with pain  Horizontal adduction: 20 degrees with pain    Passive Range of Motion   Left Shoulder   Flexion: 140 degrees with pain  Abduction: 95 degrees with pain  External rotation 45°: 25 degrees with pain  Internal rotation 45°: 40 degrees with pain             Precautions: none  POC exp 10/27/2020  Manuals 8/27 9/1 8/14 8/18 8/24   L shoulder ROM End range capsular stretch x  x x x x   Posterior capsule stretch x x x x x      (MHP prior to stretching) (MHP prior to stretching) MHP prior to stretching   Total time  x15 min x15 min x15 min x15 mins   Neuro Re-Ed        Scap retractions        Cervical retractions        Postural correction with lumbar roll                                        Ther Ex        MTP/LTP Green TB 5" 2x10 ea Green TB 5" 2x10 ea  Green TB 5" x10 ea Resume NV   B/L ER w/ TB @ wall Green TB 5" x10 Green TB 5" x15  Green TB 5" x10 ea Resume NV    RPM 5' fwd/ 5' retro  100 RPM 5' fwd/ 5' retro  120 RPM 5' fwd/ 5' retro  120 RPM 5' fwd/ 5' retro  120 RPM 5' fwd/ 5' retro    Pulleys 10" x10 Flex/Scap 10"x10 ea flex/scap 10" x10 Flex/Scap 10" x10 Flex/Scap 10" x10 Flex/Scap   Finger ladder 5" x10 Flex 10" x10 ea Flex/abd 5" x10 Flex 5" x10 Flex 5" x10 Flex   Wall slides ITY 3" x10 ea 3" x10 ea 3" x10 ea 3" x10 ea 3" x10 ea   Doorway pec stretch 30" 3 ea 30" x3 30" x3 30" 3 ea 30" 3 ea   Thoracic extension over chair 5" x10 5" x10 5" x10 5" x10 ea 5" x10 ea   Posterior capsule sleeper stretch 5" x10   5" x10 5" x10   AAROM Flex supine w/ cane 5" x10 2# 10"x10  5" x10 5" x10   AAROM ER supine w/ cane 5" x10  2# 10"x10   5" x10 5" x10   AROM Flex to 90* 2# 5" x10  standing  2# 2x10  2# 5" x10    (Consider prone ITY on pball NV) 2# 5" x10                                           Modalities        MHP to L shoulder prior to stretch x10 min NP x10 min x10 min x10 mins

## 2020-09-03 ENCOUNTER — APPOINTMENT (OUTPATIENT)
Dept: PHYSICAL THERAPY | Facility: CLINIC | Age: 50
End: 2020-09-03
Payer: COMMERCIAL

## 2020-09-08 ENCOUNTER — OFFICE VISIT (OUTPATIENT)
Dept: PHYSICAL THERAPY | Facility: CLINIC | Age: 50
End: 2020-09-08
Payer: COMMERCIAL

## 2020-09-08 DIAGNOSIS — M25.512 ACUTE PAIN OF LEFT SHOULDER: Primary | ICD-10-CM

## 2020-09-08 DIAGNOSIS — M75.02 ADHESIVE CAPSULITIS OF LEFT SHOULDER: ICD-10-CM

## 2020-09-08 PROCEDURE — 97110 THERAPEUTIC EXERCISES: CPT

## 2020-09-08 PROCEDURE — 97140 MANUAL THERAPY 1/> REGIONS: CPT

## 2020-09-08 NOTE — PROGRESS NOTES
Daily Note     Today's date: 2020  Patient name: Cira Galan  : 1970  MRN: [id number]  Referring provider: Paige Sutherland MD  Dx:   Encounter Diagnosis     ICD-10-CM    1  Acute pain of left shoulder  M25 512    2  Adhesive capsulitis of left shoulder  M75 02                   Subjective: Pt reports that she was helping her daughter pack up boxes and move over the weekend  She is happy with her strength but continues to lack overhead motion of the LUE  She denies pain at rest        Objective: See treatment diary below      Assessment: Tolerated treatment well  Pt continues to be limited in ABD and Flex of the LUE during PROM  Initiated prone AROM to facilitate strength  Pt has no exacerbation of symptoms this date  Patient demonstrated fatigue post treatment, exhibited good technique with therapeutic exercises and would benefit from continued PT  Plan: Continue per plan of care        Precautions: none  POC exp 10/27/2020  Manuals    L shoulder ROM End range capsular stretch x  x x x x   Posterior capsule stretch x x x x x       (MHP prior to stretching) MHP prior to stretching   Total time  x15 min x15 min x15 min x15 mins   Neuro Re-Ed        Scap retractions        Cervical retractions        Postural correction with lumbar roll                                        Ther Ex        MTP/LTP Green TB 5" 2x10 ea Green TB 5" 2x10 ea Blue TB 5" x20 ea Green TB 5" x10 ea Resume NV   B/L ER w/ TB @ wall Green TB 5" x10 Green TB 5" x15 Green TB 5" x15 Green TB 5" x10 ea Resume NV    RPM 5' fwd/ 5' retro  100 RPM 5' fwd/ 5' retro  100 RPM 5' fwd/ 5' retro  120 RPM 5' fwd/ 5' retro  120 RPM 5' fwd/ 5' retro    Pulleys 10" x10 Flex/Scap 10"x10 ea flex/scap 10" x10 Flex/Scap 10" x10 Flex/Scap 10" x10 Flex/Scap   Finger ladder 5" x10 Flex 10" x10 ea Flex/abd  5" x10 Flex 5" x10 Flex   Wall slides ITY 3" x10 ea 3" x10 ea 3" x10 ea 3" x10 ea 3" x10 ea   Doorway pec stretch 30" 3 ea 30" x3 30" x3 30" 3 ea 30" 3 ea   Thoracic extension over chair 5" x10 5" x10 5" x10 5" x10 ea 5" x10 ea   Posterior capsule sleeper stretch 5" x10   5" x10 5" x10   AAROM Flex supine w/ cane 5" x10 2# 10"x10  5" x10 5" x10   AAROM ER supine w/ cane 5" x10  2# 10"x10   5" x10 5" x10   AROM Flex to 90* 2# 5" x10  standing  2# 2x10 standing  2# 2x10    Flex/ABD 2# 5" x10    (Consider prone ITY on pball NV) 2# 5" x10   Prone L shoulder Flex/ Ext/ Hrz ABD   x20 ea                                     Modalities        MHP to L shoulder prior to stretch x10 min NP NP x10 min x10 mins

## 2020-09-11 ENCOUNTER — APPOINTMENT (OUTPATIENT)
Dept: PHYSICAL THERAPY | Facility: CLINIC | Age: 50
End: 2020-09-11
Payer: COMMERCIAL

## 2021-11-14 PROCEDURE — U0003 INFECTIOUS AGENT DETECTION BY NUCLEIC ACID (DNA OR RNA); SEVERE ACUTE RESPIRATORY SYNDROME CORONAVIRUS 2 (SARS-COV-2) (CORONAVIRUS DISEASE [COVID-19]), AMPLIFIED PROBE TECHNIQUE, MAKING USE OF HIGH THROUGHPUT TECHNOLOGIES AS DESCRIBED BY CMS-2020-01-R: HCPCS | Performed by: FAMILY MEDICINE

## 2021-11-14 PROCEDURE — U0005 INFEC AGEN DETEC AMPLI PROBE: HCPCS | Performed by: FAMILY MEDICINE

## 2021-12-31 PROCEDURE — U0005 INFEC AGEN DETEC AMPLI PROBE: HCPCS | Performed by: INTERNAL MEDICINE

## 2021-12-31 PROCEDURE — U0003 INFECTIOUS AGENT DETECTION BY NUCLEIC ACID (DNA OR RNA); SEVERE ACUTE RESPIRATORY SYNDROME CORONAVIRUS 2 (SARS-COV-2) (CORONAVIRUS DISEASE [COVID-19]), AMPLIFIED PROBE TECHNIQUE, MAKING USE OF HIGH THROUGHPUT TECHNOLOGIES AS DESCRIBED BY CMS-2020-01-R: HCPCS | Performed by: INTERNAL MEDICINE

## 2022-06-02 ENCOUNTER — TELEPHONE (OUTPATIENT)
Dept: OBGYN CLINIC | Facility: CLINIC | Age: 52
End: 2022-06-02

## 2022-06-10 ENCOUNTER — ULTRASOUND (OUTPATIENT)
Dept: OBGYN CLINIC | Facility: CLINIC | Age: 52
End: 2022-06-10
Payer: COMMERCIAL

## 2022-06-10 ENCOUNTER — OFFICE VISIT (OUTPATIENT)
Dept: OBGYN CLINIC | Facility: CLINIC | Age: 52
End: 2022-06-10
Payer: COMMERCIAL

## 2022-06-10 VITALS
HEIGHT: 66 IN | SYSTOLIC BLOOD PRESSURE: 124 MMHG | WEIGHT: 178.8 LBS | DIASTOLIC BLOOD PRESSURE: 76 MMHG | BODY MASS INDEX: 28.73 KG/M2

## 2022-06-10 DIAGNOSIS — Z12.31 ENCOUNTER FOR SCREENING MAMMOGRAM FOR BREAST CANCER: ICD-10-CM

## 2022-06-10 DIAGNOSIS — N93.9 ABNORMAL UTERINE BLEEDING (AUB): Primary | ICD-10-CM

## 2022-06-10 DIAGNOSIS — N92.6 IRREGULAR MENSES: Primary | ICD-10-CM

## 2022-06-10 PROCEDURE — 88305 TISSUE EXAM BY PATHOLOGIST: CPT | Performed by: PATHOLOGY

## 2022-06-10 PROCEDURE — 99213 OFFICE O/P EST LOW 20 MIN: CPT | Performed by: OBSTETRICS & GYNECOLOGY

## 2022-06-10 PROCEDURE — 76830 TRANSVAGINAL US NON-OB: CPT | Performed by: OBSTETRICS & GYNECOLOGY

## 2022-06-10 NOTE — PROGRESS NOTES
Assessment/Plan:     Irregular menses-we discussed endometrial biopsy  This was done without difficulty, see separate note, will await results  Ultrasound reviewed with her in detail  We discussed using progesterone to help with the bleeding, it could stop prolonged bleeding and we could also cycle her on this if needed  We discussed progesterone only pills and also Mirena IUD  There are no diagnoses linked to this encounter  Subjective:     Patient ID: Thi Cuadra is a 45 y o  female  Pt here for evaluation of irregular bleeding  She has had fairly regular menstrual cycles with the exception of last March in April when she did not get her cycle at all  For about the last year, her cycles have been a little bit shorter but starting in May, she has been bleeding about every other week  No pain, no other symptoms  She has had polyps in the past and has had 2 D&Cs  Ultrasound today shows multiple fibroids, two are larger than her last ultrasound which was in 2019  Ovaries are normal, endometrium is 8 mm   had vasectomy      Review of Systems   Constitutional: Negative  Genitourinary: Positive for menstrual problem  Objective:     Physical Exam  Genitourinary:     General: Normal vulva        Vagina: Normal       Cervix: Normal       Uterus: Normal        Adnexa: Right adnexa normal and left adnexa normal

## 2022-06-10 NOTE — PROGRESS NOTES
AMB US Pelvic Non OB    Date/Time: 6/10/2022 12:49 PM  Performed by: Seema Myers  Authorized by: Sam Bazzi DO   Universal Protocol:  Patient identity confirmed: verbally with patient      Procedure details:     Technique:  Transvaginal US, Non-OB    Position: lithotomy exam    Uterine findings:     Length (cm): 9 29    Height (cm):  6 26    Width (cm):  6 75    Endometrial stripe: identified      Endometrium thickness (mm):  8 6  Left ovary findings:     Left ovary:  Visualized    Length (cm): 2 53    Height (cm): 1 5    Width (cm): 1 43  Right ovary findings:     Right ovary:  Visualized    Length (cm): 2 75    Height (cm): 1 98    Width (cm): 1 71  Other findings:     Free pelvic fluid: not identified      Free peritoneal fluid: not identified    Post-Procedure Details:     Impression:  Anteverted uterus is inhomogeneous throughout with fibroids  Largest are anterior subserosal 3 5cm (previously 2 7cm), posterior intramural 2 4cm (previously 1 7cm), and anterior subserosal lower uterine segment, 2 6cm  (not seen previously)  Bilateral ovaries appear within normal limits  No free fluid  Tolerance: Tolerated well, no immediate complications    Complications: no complications    Additional Procedure Comments:      [employer] F8 E8C-RS transvaginal transducer Serial # [id number] was used to perform the examination today and subsequently followed with high level disinfection utilizing Trophon EPR procedure  Ultrasound performed at:     [address] [address]  Phone:  [phone]  Fax:  [phone]

## 2022-06-11 PROCEDURE — 58100 BIOPSY OF UTERUS LINING: CPT | Performed by: OBSTETRICS & GYNECOLOGY

## 2022-06-11 NOTE — PROGRESS NOTES
Endometrial biopsy    Date/Time: 6/11/2022 9:11 AM  Performed by: Sam Bazzi DO  Authorized by: Sam Bazzi DO   Universal Protocol:  Consent: Verbal consent obtained  Written consent obtained  Patient understanding: patient states understanding of the procedure being performed      Indication:     Indications: Other disorder of menstruation and other abnormal bleeding from female genital tract    Procedure:     Procedure: endometrial biopsy with Pipelle      A bivalve speculum was placed in the vagina: yes      Cervix cleaned and prepped: yes      The cervix was dilated: no      Uterus sounded: yes      Uterus sound depth (cm):  8    Specimen collected: specimen collected and sent to pathology    Findings:     Uterus size:  Non-gravid    Cervix: normal      Adnexa: normal    Comments:     Procedure comments:  Endometrial biopsy done without difficulty  Will await results

## 2022-06-17 ENCOUNTER — HOSPITAL ENCOUNTER (OUTPATIENT)
Dept: MAMMOGRAPHY | Facility: HOSPITAL | Age: 52
Discharge: HOME | End: 2022-06-17
Payer: COMMERCIAL

## 2022-06-17 VITALS — HEIGHT: 66 IN | BODY MASS INDEX: 28.12 KG/M2 | WEIGHT: 175 LBS

## 2022-06-17 DIAGNOSIS — Z12.31 ENCOUNTER FOR SCREENING MAMMOGRAM FOR BREAST CANCER: ICD-10-CM

## 2022-06-17 PROCEDURE — 77063 BREAST TOMOSYNTHESIS BI: CPT

## 2022-06-17 PROCEDURE — 77067 SCR MAMMO BI INCL CAD: CPT

## 2022-10-27 ENCOUNTER — ANNUAL EXAM (OUTPATIENT)
Dept: GYNECOLOGY | Facility: CLINIC | Age: 52
End: 2022-10-27

## 2022-10-27 VITALS
BODY MASS INDEX: 28.03 KG/M2 | SYSTOLIC BLOOD PRESSURE: 112 MMHG | WEIGHT: 174.4 LBS | HEIGHT: 66 IN | DIASTOLIC BLOOD PRESSURE: 76 MMHG

## 2022-10-27 DIAGNOSIS — Z12.4 CERVICAL CANCER SCREENING: Primary | ICD-10-CM

## 2022-10-27 DIAGNOSIS — Z01.419 ENCOUNTER FOR ANNUAL ROUTINE GYNECOLOGICAL EXAMINATION: ICD-10-CM

## 2022-10-27 DIAGNOSIS — Z12.31 ENCOUNTER FOR SCREENING MAMMOGRAM FOR BREAST CANCER: ICD-10-CM

## 2022-10-27 DIAGNOSIS — Z11.51 SCREENING FOR HPV (HUMAN PAPILLOMAVIRUS): ICD-10-CM

## 2022-10-27 PROCEDURE — G0476 HPV COMBO ASSAY CA SCREEN: HCPCS | Performed by: OBSTETRICS & GYNECOLOGY

## 2022-10-27 RX ORDER — OMEPRAZOLE 20 MG/1
CAPSULE, DELAYED RELEASE ORAL
COMMUNITY
Start: 2022-09-02

## 2022-10-27 NOTE — PROGRESS NOTES
Assessment/Plan:    Pap and HPV done today    Perimenopause- she will keep track of her cycles  She is aware to call if they become closer together, prolonged or heavy again  She feels her hot flashes are manageable  Fibroids -these got slightly larger from her prior ultrasound in 2019  Will recheck in a few months to ensure they are stable  mammogram reviewed with her including breast density  RX given for June    Discussed self breast exams    colon cancer screening - colonoscopy done in July, recall in 10 years  discussed preventive care, regular exercise and a healthy diet      No problem-specific Assessment & Plan notes found for this encounter  Diagnoses and all orders for this visit:    Encounter for annual routine gynecological examination    Encounter for screening mammogram for breast cancer  -     Mammo screening bilateral w 3d & cad; Future    Other orders  -     omeprazole (PriLOSEC) 20 mg delayed release capsule          Subjective:      Patient ID: Pablo Hirsch is a 47 y o  female  Patient here for yearly  She was here in June with an episode of prolonged bleeding  Her cycles had otherwise been fairly regular  Endometrial biopsy was normal   Her ultrasound showed that her fibroids were slightly larger than her last ultrasound in 2019  Since she was here, she skipped 2 months and then had a normal cycle at the end of September  She has mild hot flashes but she has had these for several years  She does not sleep well but she is under a great deal of stress    She was diagnosed with a stomach ulcer over the summer, this is feeling better       normal 3D mammogram in June with scattered fibroglandular densities and average risk   normal Pap and negative HPV in May 2019, her initial Pap showed endometrial cells but this was repeated and it was normal       The following portions of the patient's history were reviewed and updated as appropriate: allergies, current medications, past family history, past medical history, past social history, past surgical history and problem list     Review of Systems   Constitutional: Negative  Gastrointestinal: Negative  Genitourinary: Negative  Objective: There were no vitals taken for this visit  Physical Exam  Vitals reviewed  Constitutional:       Appearance: She is well-developed  Neck:      Thyroid: No thyromegaly  Trachea: No tracheal deviation  Cardiovascular:      Rate and Rhythm: Normal rate and regular rhythm  Pulmonary:      Effort: Pulmonary effort is normal       Breath sounds: Normal breath sounds  Chest:   Breasts: Breasts are symmetrical       Right: No inverted nipple, mass, nipple discharge, skin change or tenderness  Left: No inverted nipple, mass, nipple discharge, skin change or tenderness  Abdominal:      General: There is no distension  Palpations: Abdomen is soft  There is no mass  Tenderness: There is no abdominal tenderness  Genitourinary:     Labia:         Right: No rash, tenderness, lesion or injury  Left: No rash, tenderness, lesion or injury  Vagina: Normal       Cervix: No cervical motion tenderness, discharge or friability  Adnexa:         Right: No mass, tenderness or fullness  Left: No mass, tenderness or fullness          Rectum: Normal

## 2022-11-07 LAB
LAB AP GYN PRIMARY INTERPRETATION: NORMAL
Lab: NORMAL

## 2023-01-31 ENCOUNTER — ULTRASOUND (OUTPATIENT)
Dept: GYNECOLOGY | Facility: CLINIC | Age: 53
End: 2023-01-31

## 2023-01-31 DIAGNOSIS — D21.9 FIBROIDS: Primary | ICD-10-CM

## 2023-01-31 NOTE — PROGRESS NOTES
AMB US Pelvic Non OB    Date/Time: 1/31/2023 6:42 AM  Performed by: Frankey French  Authorized by: Dian Hollis DO   Universal Protocol:  Patient identity confirmed: verbally with patient      Procedure details:     Technique:  Transvaginal US, Non-OB    Position: lithotomy exam    Uterine findings:     Length (cm): 7 02    Height (cm):  5 21    Width (cm):  5 42    Endometrial stripe: identified      Endometrium thickness (mm):  5 2  Left ovary findings:     Left ovary:  Visualized    Length (cm): 2 11    Height (cm): 1 37    Width (cm): 1 71  Right ovary findings:     Right ovary:  Visualized    Length (cm): 2 43    Height (cm): 1 71    Width (cm): 2 01  Other findings:     Free pelvic fluid: not identified      Free peritoneal fluid: not identified    Post-Procedure Details:     Impression:   Anteverted uterus is inhomogeneous throughout with fibroids  Largest are anterior subserosal 3 5cm, posterior intramural 2 4cm, and anterior subserosal lower uterine segment, 2 6cm  Bilateral ovaries appear within normal limits  No free fluid  Tolerance: Tolerated well, no immediate complications  Additional Procedure Comments:      [employer] F8 E8C-RS transvaginal transducer Serial # [id number] was used to perform the examination today and subsequently followed with high level disinfection utilizing Trophon EPR procedure  Ultrasound performed at:     [address] [address]  Phone:  [phone]  Fax:  [phone]

## 2023-05-31 ENCOUNTER — TELEPHONE (OUTPATIENT)
Dept: GYNECOLOGY | Facility: CLINIC | Age: 53
End: 2023-05-31

## 2023-06-01 NOTE — TELEPHONE ENCOUNTER
Pt has spinal stenosis with her back and her sister has osteoporosis  Unruly Mixer she is getting back shots L3- L5    scoliosis    anklelosis  Unruly Tazer

## 2023-06-02 DIAGNOSIS — Z82.62 FAMILY HISTORY OF OSTEOPOROSIS IN SISTER: Primary | ICD-10-CM

## 2023-06-07 ENCOUNTER — HOSPITAL ENCOUNTER (OUTPATIENT)
Dept: BONE DENSITY | Facility: HOSPITAL | Age: 53
Discharge: HOME | End: 2023-06-07
Payer: COMMERCIAL

## 2023-06-07 VITALS — BODY MASS INDEX: 26.47 KG/M2 | HEIGHT: 67 IN | WEIGHT: 168.65 LBS

## 2023-06-07 DIAGNOSIS — Z82.62 FAMILY HISTORY OF OSTEOPOROSIS IN SISTER: ICD-10-CM

## 2023-06-07 PROCEDURE — 77080 DXA BONE DENSITY AXIAL: CPT

## 2023-06-28 ENCOUNTER — HOSPITAL ENCOUNTER (OUTPATIENT)
Dept: MAMMOGRAPHY | Facility: HOSPITAL | Age: 53
Discharge: HOME | End: 2023-06-28
Payer: COMMERCIAL

## 2023-06-28 VITALS — BODY MASS INDEX: 26.37 KG/M2 | WEIGHT: 168 LBS | HEIGHT: 67 IN

## 2023-06-28 DIAGNOSIS — Z12.31 ENCOUNTER FOR SCREENING MAMMOGRAM FOR BREAST CANCER: ICD-10-CM

## 2023-06-28 PROCEDURE — 77067 SCR MAMMO BI INCL CAD: CPT

## 2023-06-28 PROCEDURE — 77063 BREAST TOMOSYNTHESIS BI: CPT

## 2023-08-31 DIAGNOSIS — Z63.4 BEREAVEMENT: Primary | ICD-10-CM

## 2023-08-31 RX ORDER — LORAZEPAM 0.5 MG/1
0.5 TABLET ORAL EVERY 8 HOURS PRN
COMMUNITY
End: 2023-08-31 | Stop reason: SDUPTHER

## 2023-08-31 RX ORDER — LORAZEPAM 0.5 MG/1
0.5 TABLET ORAL EVERY 8 HOURS PRN
Qty: 90 TABLET | Refills: 0 | Status: SHIPPED | OUTPATIENT
Start: 2023-08-31

## 2023-08-31 SDOH — SOCIAL STABILITY - SOCIAL INSECURITY: DISSAPEARANCE AND DEATH OF FAMILY MEMBER: Z63.4

## 2023-09-29 DIAGNOSIS — B37.31 VAGINAL YEAST INFECTION: Primary | ICD-10-CM

## 2023-09-29 RX ORDER — FLUCONAZOLE 150 MG/1
150 TABLET ORAL
Qty: 2 TABLET | Refills: 0 | Status: SHIPPED | OUTPATIENT
Start: 2023-09-29 | End: 2023-10-07

## 2023-10-09 ENCOUNTER — OFFICE VISIT (OUTPATIENT)
Dept: FAMILY MEDICINE CLINIC | Facility: CLINIC | Age: 53
End: 2023-10-09
Payer: COMMERCIAL

## 2023-10-09 VITALS
DIASTOLIC BLOOD PRESSURE: 72 MMHG | WEIGHT: 167 LBS | TEMPERATURE: 97 F | HEIGHT: 67 IN | SYSTOLIC BLOOD PRESSURE: 118 MMHG | BODY MASS INDEX: 26.21 KG/M2

## 2023-10-09 DIAGNOSIS — M48.061 SPINAL STENOSIS OF LUMBAR REGION, UNSPECIFIED WHETHER NEUROGENIC CLAUDICATION PRESENT: ICD-10-CM

## 2023-10-09 DIAGNOSIS — Z00.00 ROUTINE GENERAL MEDICAL EXAMINATION AT A HEALTH CARE FACILITY: Primary | ICD-10-CM

## 2023-10-09 DIAGNOSIS — R63.5 WEIGHT GAIN: ICD-10-CM

## 2023-10-09 DIAGNOSIS — K59.09 CHRONIC CONSTIPATION: ICD-10-CM

## 2023-10-09 DIAGNOSIS — M45.6 ANKYLOSING SPONDYLITIS OF LUMBAR REGION (HCC): ICD-10-CM

## 2023-10-09 DIAGNOSIS — E55.9 VITAMIN D DEFICIENCY: ICD-10-CM

## 2023-10-09 PROBLEM — M75.02 ADHESIVE CAPSULITIS OF LEFT SHOULDER: Status: ACTIVE | Noted: 2020-07-27

## 2023-10-09 PROBLEM — N92.6 IRREGULAR BLEEDING: Status: RESOLVED | Noted: 2017-02-10 | Resolved: 2023-10-09

## 2023-10-09 PROBLEM — D21.9 FIBROIDS: Status: RESOLVED | Noted: 2017-02-10 | Resolved: 2023-10-09

## 2023-10-09 PROBLEM — N92.6 IRREGULAR BLEEDING: Status: ACTIVE | Noted: 2017-02-10

## 2023-10-09 PROBLEM — N92.6 IRREGULAR MENSES: Status: RESOLVED | Noted: 2019-07-22 | Resolved: 2023-10-09

## 2023-10-09 PROBLEM — D21.9 FIBROIDS: Status: ACTIVE | Noted: 2017-02-10

## 2023-10-09 PROBLEM — M75.02 ADHESIVE CAPSULITIS OF LEFT SHOULDER: Status: RESOLVED | Noted: 2020-07-27 | Resolved: 2023-10-09

## 2023-10-09 PROCEDURE — 99396 PREV VISIT EST AGE 40-64: CPT | Performed by: FAMILY MEDICINE

## 2023-10-09 NOTE — PROGRESS NOTES
Name: Charlotte Goins      : 1970      MRN: [id number]  Encounter Provider: Tj Roth DO  Encounter Date: 10/9/2023   Encounter department: [address] [facility]     1. Routine general medical examination at a health care facility  Comments:  Discussed age-appropriate preventative recommendations today. Order for labs. 2. Spinal stenosis of lumbar region, unspecified whether neurogenic claudication present    3. Chronic constipation  -     T4, free; Future  -     TSH, 3rd generation; Future    4. Vitamin D deficiency  -     Vitamin D 25 hydroxy; Future    5. Weight gain  -     CBC and differential; Future  -     Comprehensive metabolic panel; Future  -     Lipid Panel with Direct LDL reflex; Future  -     T4, free; Future    6. Ankylosing spondylitis of lumbar region ([address])      BMI Counseling: Body mass index is 26.16 kg/m². The BMI is above normal. Nutrition recommendations include decreasing portion sizes, moderation in carbohydrate intake and increasing intake of lean protein. Exercise recommendations include exercising 3-5 times per week. No pharmacotherapy was ordered. Rationale for BMI follow-up plan is due to patient being overweight or obese. Depression Screening and Follow-up Plan: Patient was screened for depression during today's encounter. They screened negative with a PHQ-2 score of 0. Subjective      Annual check up-here for annual checkup. History of spinal stenosis, chronic pain syndrome, status post recent rhizotomy. Patient reports back pain has improved, however peripheral joint pain has worsened since then. Also with history of peptic ulcer disease, previously on PPI therapy. Due for labs. Up-to-date with mammography, Pap smear, DEXA scan, colonoscopy. Declines flu shot today. Complains of inability to lose weight.   Also complains of brittle nails, worse since she became perimenopausal.  LMP was recent, but prior to no menstrual cycle x10 months. Reports strong family history of thyroid disease. Sees GYN. Taking some over-the-counter supplements for joint pain and hair and nails. Review of Systems   Constitutional: Negative for activity change, appetite change and fever. HENT: Negative for trouble swallowing. Respiratory: Negative for apnea, cough, chest tightness and shortness of breath. Cardiovascular: Negative for chest pain, palpitations and leg swelling. Gastrointestinal: Negative for abdominal pain. Musculoskeletal: Negative for back pain and gait problem. Neurological: Negative for dizziness and light-headedness. Current Outpatient Medications on File Prior to Visit   Medication Sig   • albuterol (PROVENTIL HFA,VENTOLIN HFA) 90 mcg/act inhaler Inhale 2 puffs every 4 (four) hours as needed    • LORazepam (ATIVAN) 0.5 mg tablet Take 1 tablet (0.5 mg total) by mouth every 8 (eight) hours as needed for anxiety   • omeprazole (PriLOSEC) 20 mg delayed release capsule    • [DISCONTINUED] norethindrone (Aygestin) 5 mg tablet Take 1 tablet (5 mg total) by mouth daily (Patient not taking: Reported on 10/27/2022)       Objective     /72 (BP Location: Left arm, Patient Position: Sitting, Cuff Size: Adult)   Temp (!) 97 °F (36.1 °C) (Tympanic)   Ht 5' 7" (1.702 m)   Wt 75.8 kg (167 lb)   BMI 26.16 kg/m²     Physical Exam  Vitals and nursing note reviewed. Constitutional:       General: She is not in acute distress. Appearance: Normal appearance. She is normal weight. She is not ill-appearing or toxic-appearing. HENT:      Head: Normocephalic. Right Ear: Tympanic membrane, ear canal and external ear normal.      Left Ear: Tympanic membrane, ear canal and external ear normal.      Nose: Nose normal.      Mouth/Throat:      Mouth: Mucous membranes are moist.      Pharynx: Oropharynx is clear. Eyes:      Extraocular Movements: Extraocular movements intact.       Pupils: Pupils are equal, round, and reactive to light. Cardiovascular:      Rate and Rhythm: Normal rate and regular rhythm. Pulses: Normal pulses. Heart sounds: No murmur heard. No gallop. Pulmonary:      Effort: Pulmonary effort is normal. No respiratory distress. Breath sounds: Normal breath sounds. No wheezing, rhonchi or rales. Abdominal:      General: Abdomen is flat. Bowel sounds are normal. There is no distension. Palpations: Abdomen is soft. Tenderness: There is no abdominal tenderness. There is no guarding. Hernia: No hernia is present. Musculoskeletal:         General: No swelling. Normal range of motion. Cervical back: Normal range of motion. No rigidity. Lymphadenopathy:      Cervical: No cervical adenopathy. Skin:     General: Skin is warm. Neurological:      General: No focal deficit present. Mental Status: She is alert and oriented to person, place, and time. Mental status is at baseline. Psychiatric:         Mood and Affect: Mood normal.         Behavior: Behavior normal.         Thought Content:  Thought content normal.         Judgment: Judgment normal.       Nini Black DO

## 2023-10-10 ENCOUNTER — LAB (OUTPATIENT)
Age: 53
End: 2023-10-10
Payer: COMMERCIAL

## 2023-10-10 DIAGNOSIS — K59.09 CHRONIC CONSTIPATION: ICD-10-CM

## 2023-10-10 DIAGNOSIS — R63.5 WEIGHT GAIN: ICD-10-CM

## 2023-10-10 DIAGNOSIS — E55.9 VITAMIN D DEFICIENCY: ICD-10-CM

## 2023-10-10 LAB
25(OH)D3 SERPL-MCNC: 33.8 NG/ML (ref 30–100)
ALBUMIN SERPL BCP-MCNC: 4.4 G/DL (ref 3.5–5)
ALP SERPL-CCNC: 76 U/L (ref 34–104)
ALT SERPL W P-5'-P-CCNC: 17 U/L (ref 7–52)
ANION GAP SERPL CALCULATED.3IONS-SCNC: 8 MMOL/L
AST SERPL W P-5'-P-CCNC: 18 U/L (ref 13–39)
BASOPHILS # BLD AUTO: 0.06 THOUSANDS/ÂΜL (ref 0–0.1)
BASOPHILS NFR BLD AUTO: 1 % (ref 0–1)
BILIRUB SERPL-MCNC: 0.39 MG/DL (ref 0.2–1)
BUN SERPL-MCNC: 20 MG/DL (ref 5–25)
CALCIUM SERPL-MCNC: 9.9 MG/DL (ref 8.4–10.2)
CHLORIDE SERPL-SCNC: 104 MMOL/L (ref 96–108)
CHOLEST SERPL-MCNC: 201 MG/DL
CO2 SERPL-SCNC: 28 MMOL/L (ref 21–32)
CREAT SERPL-MCNC: 0.9 MG/DL (ref 0.6–1.3)
EOSINOPHIL # BLD AUTO: 0.23 THOUSAND/ÂΜL (ref 0–0.61)
EOSINOPHIL NFR BLD AUTO: 3 % (ref 0–6)
ERYTHROCYTE [DISTWIDTH] IN BLOOD BY AUTOMATED COUNT: 12.7 % (ref 11.6–15.1)
GFR SERPL CREATININE-BSD FRML MDRD: 72 ML/MIN/1.73SQ M
GLUCOSE P FAST SERPL-MCNC: 86 MG/DL (ref 65–99)
HCT VFR BLD AUTO: 42.8 % (ref 34.8–46.1)
HDLC SERPL-MCNC: 74 MG/DL
HGB BLD-MCNC: 13.6 G/DL (ref 11.5–15.4)
IMM GRANULOCYTES # BLD AUTO: 0.02 THOUSAND/UL (ref 0–0.2)
IMM GRANULOCYTES NFR BLD AUTO: 0 % (ref 0–2)
LDLC SERPL CALC-MCNC: 111 MG/DL (ref 0–100)
LYMPHOCYTES # BLD AUTO: 1.64 THOUSANDS/ÂΜL (ref 0.6–4.47)
LYMPHOCYTES NFR BLD AUTO: 23 % (ref 14–44)
MCH RBC QN AUTO: 29.7 PG (ref 26.8–34.3)
MCHC RBC AUTO-ENTMCNC: 31.8 G/DL (ref 31.4–37.4)
MCV RBC AUTO: 93 FL (ref 82–98)
MONOCYTES # BLD AUTO: 0.5 THOUSAND/ÂΜL (ref 0.17–1.22)
MONOCYTES NFR BLD AUTO: 7 % (ref 4–12)
NEUTROPHILS # BLD AUTO: 4.74 THOUSANDS/ÂΜL (ref 1.85–7.62)
NEUTS SEG NFR BLD AUTO: 66 % (ref 43–75)
NRBC BLD AUTO-RTO: 0 /100 WBCS
PLATELET # BLD AUTO: 243 THOUSANDS/UL (ref 149–390)
PMV BLD AUTO: 10.8 FL (ref 8.9–12.7)
POTASSIUM SERPL-SCNC: 4.1 MMOL/L (ref 3.5–5.3)
PROT SERPL-MCNC: 7.6 G/DL (ref 6.4–8.4)
RBC # BLD AUTO: 4.58 MILLION/UL (ref 3.81–5.12)
SODIUM SERPL-SCNC: 140 MMOL/L (ref 135–147)
T4 FREE SERPL-MCNC: 0.71 NG/DL (ref 0.61–1.12)
TRIGL SERPL-MCNC: 81 MG/DL
TSH SERPL DL<=0.05 MIU/L-ACNC: 3.79 UIU/ML (ref 0.45–4.5)
WBC # BLD AUTO: 7.19 THOUSAND/UL (ref 4.31–10.16)

## 2023-10-10 PROCEDURE — 84439 ASSAY OF FREE THYROXINE: CPT

## 2023-10-10 PROCEDURE — 80061 LIPID PANEL: CPT

## 2023-10-10 PROCEDURE — 85025 COMPLETE CBC W/AUTO DIFF WBC: CPT

## 2023-10-10 PROCEDURE — 82306 VITAMIN D 25 HYDROXY: CPT

## 2023-10-10 PROCEDURE — 80053 COMPREHEN METABOLIC PANEL: CPT

## 2023-10-10 PROCEDURE — 84443 ASSAY THYROID STIM HORMONE: CPT

## 2023-10-10 PROCEDURE — 36415 COLL VENOUS BLD VENIPUNCTURE: CPT

## 2023-11-02 ENCOUNTER — ANNUAL EXAM (OUTPATIENT)
Dept: GYNECOLOGY | Facility: CLINIC | Age: 53
End: 2023-11-02

## 2023-11-02 VITALS
SYSTOLIC BLOOD PRESSURE: 148 MMHG | BODY MASS INDEX: 26.74 KG/M2 | DIASTOLIC BLOOD PRESSURE: 80 MMHG | HEIGHT: 66 IN | WEIGHT: 166.4 LBS

## 2023-11-02 DIAGNOSIS — Z01.419 ENCOUNTER FOR ANNUAL ROUTINE GYNECOLOGICAL EXAMINATION: Primary | ICD-10-CM

## 2023-11-02 DIAGNOSIS — Z12.31 ENCOUNTER FOR SCREENING MAMMOGRAM FOR BREAST CANCER: ICD-10-CM

## 2023-11-02 NOTE — PROGRESS NOTES
Assessment/Plan:    pap is up to date    mammogram reviewed with her including breast density. RX given for June  Discussed self breast exams    colon cancer screening-colonoscopy in July 2022, recall in 10 years    Perimenopause-she will keep track of her cycles, she is aware of symptoms with which she should call. Hot flashes-we reviewed treatment options including nonhormonal options and hormone replacement therapy. She was given information to review. She will call if she would like to start something    discussed preventive care, regular exercise and a healthy diet      No problem-specific Assessment & Plan notes found for this encounter. Diagnoses and all orders for this visit:    Encounter for annual routine gynecological examination    Encounter for screening mammogram for breast cancer  -     Mammo screening bilateral w 3d & cad; Future          Subjective:      Patient ID: Stanford Green is a 46 y.o. female. Patient here for yearly. She is skipping cycles, she went 10 months without one and then had one in August.  She is having hot flashes and they are bothersome at times. She is having difficulty losing weight. She does not eat well, often skipping meals. Normal Pap, negative HPV in October 2022  DEXA in June showed mild osteopenia, she did not meet treatment criteria. This was ordered early since her sister was diagnosed with early osteoporosis  Normal 3D mammogram in June showed scattered fibroglandular densities and average risk        The following portions of the patient's history were reviewed and updated as appropriate: allergies, current medications, past family history, past medical history, past social history, past surgical history, and problem list.    Review of Systems   Constitutional: Negative. Gastrointestinal: Negative. Endocrine: Positive for heat intolerance. Genitourinary: Negative.           Objective:      /80 (BP Location: Right arm, Patient Position: Sitting)   Ht 5' 5.75" (1.67 m)   Wt 75.5 kg (166 lb 6.4 oz)   BMI 27.06 kg/m²          Physical Exam  Vitals reviewed. Constitutional:       Appearance: Normal appearance. She is well-developed. Neck:      Thyroid: No thyromegaly. Trachea: No tracheal deviation. Cardiovascular:      Rate and Rhythm: Normal rate and regular rhythm. Pulmonary:      Effort: Pulmonary effort is normal.      Breath sounds: Normal breath sounds. Chest:   Breasts:     Breasts are symmetrical.      Right: No inverted nipple, mass, nipple discharge, skin change or tenderness. Left: No inverted nipple, mass, nipple discharge, skin change or tenderness. Abdominal:      General: There is no distension. Palpations: Abdomen is soft. There is no mass. Tenderness: There is no abdominal tenderness. Genitourinary:     Labia:         Right: No rash, tenderness, lesion or injury. Left: No rash, tenderness, lesion or injury. Vagina: Normal.      Cervix: No cervical motion tenderness, discharge or friability. Adnexa:         Right: No mass, tenderness or fullness. Left: No mass, tenderness or fullness. Rectum: Normal.   Neurological:      Mental Status: She is alert.

## 2024-03-20 ENCOUNTER — TELEPHONE (OUTPATIENT)
Dept: ADMINISTRATIVE | Facility: OTHER | Age: 54
End: 2024-03-20

## 2024-03-20 NOTE — TELEPHONE ENCOUNTER
----- Message from Elaina Nance MA sent at 3/20/2024  8:26 AM EDT -----  Regarding: colonoscopy  03/20/24 8:26 AM    Hello, our patient Jody Galarza has had CRC: Colonoscopy completed/performed. Please assist in updating the patient chart by pulling the document from the Media Tab. The date of service is 07/28/2022.     Thank you,  Elaina Nance MA  [facility]

## 2024-03-20 NOTE — TELEPHONE ENCOUNTER
Upon review of the In Basket request we were able to locate, review, and update the patient chart as requested for CRC: Colonoscopy.    Any additional questions or concerns should be emailed to the Practice Liaisons via the appropriate education email address, please do not reply via In Basket.    Thank you  KELLI LEVINE

## 2024-03-21 ENCOUNTER — OFFICE VISIT (OUTPATIENT)
Dept: FAMILY MEDICINE CLINIC | Facility: CLINIC | Age: 54
End: 2024-03-21
Payer: COMMERCIAL

## 2024-03-21 VITALS
BODY MASS INDEX: 25.43 KG/M2 | OXYGEN SATURATION: 98 % | DIASTOLIC BLOOD PRESSURE: 80 MMHG | WEIGHT: 162 LBS | TEMPERATURE: 98.6 F | HEART RATE: 85 BPM | SYSTOLIC BLOOD PRESSURE: 122 MMHG | HEIGHT: 67 IN

## 2024-03-21 DIAGNOSIS — R60.9 EDEMA, UNSPECIFIED TYPE: Primary | ICD-10-CM

## 2024-03-21 DIAGNOSIS — Z63.4 BEREAVEMENT: ICD-10-CM

## 2024-03-21 DIAGNOSIS — R63.5 WEIGHT GAIN: ICD-10-CM

## 2024-03-21 DIAGNOSIS — N95.1 PERIMENOPAUSAL SYMPTOMS: ICD-10-CM

## 2024-03-21 DIAGNOSIS — N95.1 INSOMNIA ASSOCIATED WITH MENOPAUSE: ICD-10-CM

## 2024-03-21 PROCEDURE — 99213 OFFICE O/P EST LOW 20 MIN: CPT | Performed by: NURSE PRACTITIONER

## 2024-03-21 RX ORDER — HYDROCHLOROTHIAZIDE 25 MG/1
25 TABLET ORAL DAILY
Qty: 30 TABLET | Refills: 1 | Status: SHIPPED | OUTPATIENT
Start: 2024-03-21

## 2024-03-21 SDOH — SOCIAL STABILITY - SOCIAL INSECURITY: DISSAPEARANCE AND DEATH OF FAMILY MEMBER: Z63.4

## 2024-03-21 NOTE — PROGRESS NOTES
Name: Jody Galarza      : 1970      MRN: [id number]  Encounter Provider: ADRIANA Cabrera  Encounter Date: 3/21/2024   Encounter department: [facility]    Assessment & Plan     1. Edema, unspecified type  -     hydroCHLOROthiazide 25 mg tablet; Take 1 tablet (25 mg total) by mouth daily  -     TSH + Free T4; Future  -     Basic metabolic panel; Future    2. Perimenopausal symptoms    3. Weight gain  Comments:  More difficulty losing weight than actual gaining.    4. BMI 25.0-25.9,adult  Comments:  Explained she is really right where she should be, from a health perspective  To add weight training or a gym routine for weight loss    5. Bereavement    6. Insomnia associated with menopause  Comments:  Factors but I believe worse due to menopause.  [place] diet exercise stress relief ,yoga, qi gong           Subjective      Patient is here for discussion about not feeling right.  More specifically she feels like her body is not responding to her usual interventions for weight loss.  She has a low carb intake and she walks 2 to 3 miles a day.  Sometimes she will do little extra exercises in between.  Just feels like her body does not feel quite as energetic as it used to.  She does not have any chest pain or shortness of breath with exertion.  But legs feel heavy and sluggish at times.  She is retaining a little bit of water.  He is to watch her salt intake.  She never was a great sleeper she used to sleep 5 hours a night.  A lot change since she lost her daughter 3 years ago.  She often has restless nights but they had been improving a little.  Holidays seem to hit her very hard this year so between  and this month which is the 3-year anniversary of her daughter's death, she has been feeling a little bit more stressed lately.  But she is trying to take care of herself and she tries to help other people it is in her grief group as well.  Years if her thyroid could be  "a bit off.  She has a sister with hypothyroidism.  Has not needed any antidepressants.  She does occasionally take her lorazepam.  She is perimenopausal.  Her last menstrual period was August.  That period was the first when she had had in quite a few months.  She actually lost 5 pounds since October.  She is not impressed by that but I pointed out that she really did not have much to lose in fact her BMI is a 25 she where is where she needs to be health wise      Review of Systems    Current Outpatient Medications on File Prior to Visit   Medication Sig    albuterol (PROVENTIL HFA,VENTOLIN HFA) 90 mcg/act inhaler Inhale 2 puffs every 4 (four) hours as needed     LORazepam (ATIVAN) 0.5 mg tablet Take 1 tablet (0.5 mg total) by mouth every 8 (eight) hours as needed for anxiety    omeprazole (PriLOSEC) 20 mg delayed release capsule  (Patient not taking: Reported on 3/21/2024)       Objective     /80 (BP Location: Left arm, Patient Position: Sitting, Cuff Size: Standard)   Pulse 85   Temp 98.6 °F (37 °C) (Tympanic)   Ht 5' 7\" (1.702 m)   Wt 73.5 kg (162 lb)   SpO2 98%   BMI 25.37 kg/m²     Physical Exam  Cardiovascular:      Rate and Rhythm: Normal rate and regular rhythm.   Pulmonary:      Effort: Pulmonary effort is normal.      Breath sounds: Normal breath sounds.   Musculoskeletal:         General: Normal range of motion.      Comments: Trace pitting edema bilaterally   Psychiatric:         Mood and Affect: Mood normal.         Behavior: Behavior normal.         Thought Content: Thought content normal.         Judgment: Judgment normal.      Comments: She is doing well for all she has been through.  Her life is permanently changed but she is committed to doing living the best life she can having been through it.       ADRIANA Cabrera    "

## 2024-03-22 ENCOUNTER — APPOINTMENT (OUTPATIENT)
Age: 54
End: 2024-03-22
Payer: COMMERCIAL

## 2024-03-22 DIAGNOSIS — R60.9 EDEMA, UNSPECIFIED TYPE: ICD-10-CM

## 2024-03-22 LAB
ANION GAP SERPL CALCULATED.3IONS-SCNC: 7 MMOL/L (ref 4–13)
BUN SERPL-MCNC: 22 MG/DL (ref 5–25)
CALCIUM SERPL-MCNC: 9.8 MG/DL (ref 8.4–10.2)
CHLORIDE SERPL-SCNC: 102 MMOL/L (ref 96–108)
CO2 SERPL-SCNC: 30 MMOL/L (ref 21–32)
CREAT SERPL-MCNC: 0.87 MG/DL (ref 0.6–1.3)
GFR SERPL CREATININE-BSD FRML MDRD: 75 ML/MIN/1.73SQ M
GLUCOSE P FAST SERPL-MCNC: 86 MG/DL (ref 65–99)
POTASSIUM SERPL-SCNC: 4.4 MMOL/L (ref 3.5–5.3)
SODIUM SERPL-SCNC: 139 MMOL/L (ref 135–147)
T4 FREE SERPL-MCNC: 0.66 NG/DL (ref 0.61–1.12)
TSH SERPL DL<=0.05 MIU/L-ACNC: 2.31 UIU/ML (ref 0.45–4.5)

## 2024-03-22 PROCEDURE — 80048 BASIC METABOLIC PNL TOTAL CA: CPT

## 2024-03-22 PROCEDURE — 84443 ASSAY THYROID STIM HORMONE: CPT

## 2024-03-22 PROCEDURE — 84439 ASSAY OF FREE THYROXINE: CPT

## 2024-03-22 PROCEDURE — 36415 COLL VENOUS BLD VENIPUNCTURE: CPT

## 2024-04-29 ENCOUNTER — TELEPHONE (OUTPATIENT)
Age: 54
End: 2024-04-29

## 2024-04-29 DIAGNOSIS — M48.061 SPINAL STENOSIS OF LUMBAR REGION, UNSPECIFIED WHETHER NEUROGENIC CLAUDICATION PRESENT: Primary | ICD-10-CM

## 2024-04-29 RX ORDER — PREDNISONE 10 MG/1
TABLET ORAL
Qty: 40 TABLET | Refills: 0 | Status: SHIPPED | OUTPATIENT
Start: 2024-04-29

## 2024-04-29 NOTE — TELEPHONE ENCOUNTER
[facility]

## 2024-04-29 NOTE — TELEPHONE ENCOUNTER
Patient calling notify  that her arthritis in her back is having a flare up and she is asking for steroids to be sent to he pharmacy for you.

## 2024-05-23 DIAGNOSIS — R60.9 EDEMA, UNSPECIFIED TYPE: ICD-10-CM

## 2024-05-24 RX ORDER — HYDROCHLOROTHIAZIDE 25 MG/1
25 TABLET ORAL DAILY
Qty: 30 TABLET | Refills: 5 | Status: SHIPPED | OUTPATIENT
Start: 2024-05-24

## 2024-06-28 ENCOUNTER — HOSPITAL ENCOUNTER (OUTPATIENT)
Dept: MAMMOGRAPHY | Facility: HOSPITAL | Age: 54
End: 2024-06-28
Payer: COMMERCIAL

## 2024-06-28 DIAGNOSIS — Z12.31 ENCOUNTER FOR SCREENING MAMMOGRAM FOR BREAST CANCER: ICD-10-CM

## 2024-06-28 PROCEDURE — 77067 SCR MAMMO BI INCL CAD: CPT

## 2024-06-28 PROCEDURE — 77063 BREAST TOMOSYNTHESIS BI: CPT

## 2024-08-21 ENCOUNTER — TELEPHONE (OUTPATIENT)
Age: 54
End: 2024-08-21

## 2024-08-21 NOTE — TELEPHONE ENCOUNTER
Patient's appointment needs to be rescheduled due to change in office hours. Provider does not have any available appointments until September 2025. Contact number on file to assist patient with rescheduling.

## 2024-08-22 ENCOUNTER — PATIENT MESSAGE (OUTPATIENT)
Dept: GYNECOLOGY | Facility: CLINIC | Age: 54
End: 2024-08-22

## 2024-08-26 DIAGNOSIS — Z63.4 BEREAVEMENT: ICD-10-CM

## 2024-08-26 SDOH — SOCIAL STABILITY - SOCIAL INSECURITY: DISSAPEARANCE AND DEATH OF FAMILY MEMBER: Z63.4

## 2024-08-28 RX ORDER — LORAZEPAM 0.5 MG/1
0.5 TABLET ORAL EVERY 8 HOURS PRN
Qty: 90 TABLET | Refills: 0 | Status: SHIPPED | OUTPATIENT
Start: 2024-08-28

## 2024-10-16 ENCOUNTER — OFFICE VISIT (OUTPATIENT)
Dept: FAMILY MEDICINE CLINIC | Facility: CLINIC | Age: 54
End: 2024-10-16
Payer: COMMERCIAL

## 2024-10-16 VITALS
TEMPERATURE: 96.8 F | DIASTOLIC BLOOD PRESSURE: 68 MMHG | BODY MASS INDEX: 24.11 KG/M2 | OXYGEN SATURATION: 97 % | SYSTOLIC BLOOD PRESSURE: 108 MMHG | WEIGHT: 153.6 LBS | HEART RATE: 70 BPM | HEIGHT: 67 IN

## 2024-10-16 DIAGNOSIS — R53.83 OTHER FATIGUE: ICD-10-CM

## 2024-10-16 DIAGNOSIS — M45.6 ANKYLOSING SPONDYLITIS OF LUMBAR REGION (HCC): ICD-10-CM

## 2024-10-16 DIAGNOSIS — R60.9 EDEMA, UNSPECIFIED TYPE: ICD-10-CM

## 2024-10-16 DIAGNOSIS — Z00.00 WELL ADULT EXAM: Primary | ICD-10-CM

## 2024-10-16 DIAGNOSIS — Z78.0 MENOPAUSE: ICD-10-CM

## 2024-10-16 DIAGNOSIS — G47.00 INSOMNIA, UNSPECIFIED TYPE: ICD-10-CM

## 2024-10-16 PROCEDURE — 99396 PREV VISIT EST AGE 40-64: CPT | Performed by: NURSE PRACTITIONER

## 2024-10-16 NOTE — PROGRESS NOTES
Ambulatory Visit  Name: Jody Galarza      : 1970      MRN: [id number]  Encounter Provider: ADRIANA Cabrera  Encounter Date: 10/16/2024   Encounter department: [facility] PRIMARY CARE    Assessment & Plan  Well adult exam         Ankylosing spondylitis of lumbar region (HCC)  Stable        Edema, unspecified type  Stable       Other fatigue  Monitor       Insomnia, unspecified type  Hoping it would improve were tissue to start hormonal therapy from GYN  Could consider trazodone in the future       Menopause            History of Present Illness     Patient is doing fairly well.  As good as she can be.  She still and will always go grief that the death of her daughter several years ago.  She lives an active life she is busy she has been really trying to lose weight she has changed her diet she is physically active she does not get chest pain or shortness of breath with syncope.  In between she seems a little tired.  But that has always been a complaint that she pushed through.  Her back pain from her ankylosing spondylitis has been stable since she had pain management procedure  She also has a lot of chronic insomnia.  Seem to get worse after menopause.  It has been over a year since she had her last menses.  She does have GYN appointment upcoming.  She does not want to take any kind of medication that would cause weight gain.  She has heard that from friends that may be able to get a patch so she would like to investigate that.  Not really interested in another medication to help her sleep.  She felt worse with melatonin and has a paradoxical effect from Benadryl.          Review of Systems   Constitutional:  Negative for chills and fever.   HENT:  Negative for ear pain and sore throat.    Eyes:  Negative for pain and visual disturbance.   Respiratory:  Negative for cough and shortness of breath.    Cardiovascular:  Negative for chest pain and palpitations.   Gastrointestinal:  Negative  "for abdominal pain and vomiting.   Genitourinary:  Negative for dysuria and hematuria.   Musculoskeletal:  Negative for arthralgias and back pain.   Skin:  Negative for color change and rash.   Neurological:  Negative for seizures and syncope.   All other systems reviewed and are negative.          Objective     /68 (BP Location: Left arm, Patient Position: Sitting, Cuff Size: Large)   Pulse 70   Temp (!) 96.8 °F (36 °C) (Tympanic)   Ht 5' 7\" (1.702 m)   Wt 69.7 kg (153 lb 9.6 oz)   SpO2 97%   BMI 24.06 kg/m²     Physical Exam  Vitals and nursing note reviewed.   Constitutional:       General: She is not in acute distress.     Appearance: She is well-developed.      Comments: Appears just a little more tired than usual.   HENT:      Head: Normocephalic and atraumatic.   Eyes:      Conjunctiva/sclera: Conjunctivae normal.   Cardiovascular:      Rate and Rhythm: Normal rate and regular rhythm.      Heart sounds: No murmur heard.  Pulmonary:      Effort: Pulmonary effort is normal. No respiratory distress.      Breath sounds: Normal breath sounds.   Abdominal:      Palpations: Abdomen is soft.      Tenderness: There is no abdominal tenderness.   Musculoskeletal:         General: No swelling.      Cervical back: Neck supple.   Skin:     General: Skin is warm and dry.      Capillary Refill: Capillary refill takes less than 2 seconds.   Neurological:      Mental Status: She is alert.   Psychiatric:         Mood and Affect: Mood normal.         "

## 2024-11-05 ENCOUNTER — ANNUAL EXAM (OUTPATIENT)
Dept: GYNECOLOGY | Facility: CLINIC | Age: 54
End: 2024-11-05
Payer: COMMERCIAL

## 2024-11-05 VITALS
HEIGHT: 67 IN | DIASTOLIC BLOOD PRESSURE: 68 MMHG | WEIGHT: 150.4 LBS | SYSTOLIC BLOOD PRESSURE: 122 MMHG | BODY MASS INDEX: 23.61 KG/M2

## 2024-11-05 DIAGNOSIS — R23.2 HOT FLASHES: ICD-10-CM

## 2024-11-05 DIAGNOSIS — R61 NIGHT SWEATS: ICD-10-CM

## 2024-11-05 DIAGNOSIS — Z12.31 ENCOUNTER FOR SCREENING MAMMOGRAM FOR BREAST CANCER: ICD-10-CM

## 2024-11-05 DIAGNOSIS — Z01.419 ENCOUNTER FOR ANNUAL ROUTINE GYNECOLOGICAL EXAMINATION: Primary | ICD-10-CM

## 2024-11-05 DIAGNOSIS — N89.8 VAGINAL ODOR: ICD-10-CM

## 2024-11-05 LAB
BV WHIFF TEST VAG QL: NEGATIVE
CLUE CELLS SPEC QL WET PREP: NEGATIVE
SL AMB POCT WET MOUNT: NORMAL
T VAGINALIS VAG QL WET PREP: NEGATIVE
YEAST VAG QL WET PREP: NEGATIVE

## 2024-11-05 PROCEDURE — 87210 SMEAR WET MOUNT SALINE/INK: CPT | Performed by: OBSTETRICS & GYNECOLOGY

## 2024-11-05 PROCEDURE — S0612 ANNUAL GYNECOLOGICAL EXAMINA: HCPCS | Performed by: OBSTETRICS & GYNECOLOGY

## 2024-11-05 NOTE — PROGRESS NOTES
Ambulatory Visit  Name: Jody Galarza      : 1970      MRN: [id number]  Encounter Provider: Justine Callahan DO  Encounter Date: 2024   Encounter department: [facility] [place]    Assessment & Plan  Encounter for annual routine gynecological examination       pap is up to date    mammogram reviewed with her including breast density.  RX given for next   Discussed self breast exams    Night sweats and hot flashes-we discussed treatment including hormone replacement therapy, low-dose antidepressant such as Paxil, Veozah.  She is concerned about weight gain.  We discussed side effects of all of these options.  She thinks that she is most interested in HRT.  I gave her information and she is going to give this more consideration.    Vaginal odor-her wet mount is normal.  This is occasional and may be from excessive sweat when she exercises.  If it changes, she will let us know    colon cancer screening-colonoscopy in 2022, recall in 10 years    discussed preventive care, regular exercise and a healthy diet    Encounter for screening mammogram for breast cancer    Orders:    Mammo screening bilateral w 3d and cad; Future    Night sweats         Hot flashes         Vaginal odor    Orders:    POCT wet mount      History of Present Illness     Jody Galarza is a 54 y.o. female who presents for yearly.  She has been perimenopausal.  Her LMP was .  Night sweats are bothersome, not as bad during the day.  Sometimes, she notices a fishy odor.  This seems to occur after exercise.  She feels that she sweats a lot.  It does not occur after intercourse.  No vaginal dryness.  She has lost about 17 pounds since last year with changes in diet and daily exercise.    Normal pap, negative HPv in   Normal 3D mammogram in  with scattered fibroglandular densities and average risk      Review of Systems   Constitutional: Negative.    Gastrointestinal: Negative.    Endocrine: Positive for  "heat intolerance.   Genitourinary: Negative.            Objective     /68 (BP Location: Left arm, Patient Position: Sitting)   Ht 5' 7\" (1.702 m)   Wt 68.2 kg (150 lb 6.4 oz)   BMI 23.56 kg/m²     Physical Exam  Vitals and nursing note reviewed. Exam conducted with a chaperone present.   Constitutional:       Appearance: She is well-developed.   HENT:      Head: Normocephalic and atraumatic.   Neck:      Thyroid: No thyromegaly.   Cardiovascular:      Rate and Rhythm: Normal rate and regular rhythm.   Pulmonary:      Effort: Pulmonary effort is normal.      Breath sounds: Normal breath sounds.   Chest:   Breasts:     Right: Normal.      Left: Normal.      Comments: Examined seated and supine  Abdominal:      Palpations: Abdomen is soft.   Genitourinary:     General: Normal vulva.      Vagina: Normal.      Cervix: Normal.      Uterus: Normal.       Adnexa: Right adnexa normal and left adnexa normal.      Rectum: Normal.   Musculoskeletal:      Cervical back: Neck supple.   Skin:     General: Skin is warm and dry.   Neurological:      Mental Status: She is alert.   Psychiatric:         Mood and Affect: Mood normal.         "

## 2025-02-24 DIAGNOSIS — R60.9 EDEMA, UNSPECIFIED TYPE: ICD-10-CM

## 2025-02-25 RX ORDER — HYDROCHLOROTHIAZIDE 25 MG/1
25 TABLET ORAL DAILY
Qty: 30 TABLET | Refills: 5 | Status: SHIPPED | OUTPATIENT
Start: 2025-02-25

## 2025-03-11 ENCOUNTER — OFFICE VISIT (OUTPATIENT)
Dept: FAMILY MEDICINE CLINIC | Facility: CLINIC | Age: 55
End: 2025-03-11
Payer: COMMERCIAL

## 2025-03-11 ENCOUNTER — APPOINTMENT (OUTPATIENT)
Dept: RADIOLOGY | Facility: CLINIC | Age: 55
End: 2025-03-11
Payer: COMMERCIAL

## 2025-03-11 VITALS
BODY MASS INDEX: 24.58 KG/M2 | SYSTOLIC BLOOD PRESSURE: 144 MMHG | HEART RATE: 75 BPM | WEIGHT: 156.6 LBS | DIASTOLIC BLOOD PRESSURE: 78 MMHG | TEMPERATURE: 96.6 F | HEIGHT: 67 IN | OXYGEN SATURATION: 99 %

## 2025-03-11 DIAGNOSIS — Z23 ENCOUNTER FOR IMMUNIZATION: ICD-10-CM

## 2025-03-11 DIAGNOSIS — S61.211A LACERATION OF LEFT INDEX FINGER, FOREIGN BODY PRESENCE UNSPECIFIED, NAIL DAMAGE STATUS UNSPECIFIED, INITIAL ENCOUNTER: ICD-10-CM

## 2025-03-11 DIAGNOSIS — S61.211A LACERATION OF LEFT INDEX FINGER, FOREIGN BODY PRESENCE UNSPECIFIED, NAIL DAMAGE STATUS UNSPECIFIED, INITIAL ENCOUNTER: Primary | ICD-10-CM

## 2025-03-11 PROCEDURE — 99214 OFFICE O/P EST MOD 30 MIN: CPT | Performed by: STUDENT IN AN ORGANIZED HEALTH CARE EDUCATION/TRAINING PROGRAM

## 2025-03-11 PROCEDURE — 12001 RPR S/N/AX/GEN/TRNK 2.5CM/<: CPT | Performed by: STUDENT IN AN ORGANIZED HEALTH CARE EDUCATION/TRAINING PROGRAM

## 2025-03-11 PROCEDURE — 90715 TDAP VACCINE 7 YRS/> IM: CPT

## 2025-03-11 PROCEDURE — 12020 TX SUPFC WND DEHSN SMPL CLSR: CPT | Performed by: STUDENT IN AN ORGANIZED HEALTH CARE EDUCATION/TRAINING PROGRAM

## 2025-03-11 PROCEDURE — 73140 X-RAY EXAM OF FINGER(S): CPT

## 2025-03-11 PROCEDURE — 90471 IMMUNIZATION ADMIN: CPT

## 2025-03-11 RX ORDER — CEPHALEXIN 500 MG/1
500 CAPSULE ORAL EVERY 8 HOURS SCHEDULED
Qty: 21 CAPSULE | Refills: 0 | Status: SHIPPED | OUTPATIENT
Start: 2025-03-11 | End: 2025-03-18

## 2025-03-11 NOTE — PROGRESS NOTES
Name: Jody Galarza      : 1970      MRN: [id number]  Encounter Provider: Danie Dickson MD  Encounter Date: 3/11/2025   Encounter department: [facility] PRIMARY CARE  :  Assessment & Plan  Laceration of left index finger, foreign body presence unspecified, nail damage status unspecified, initial encounter    Orders:    Laceration repair    XR finger left second digit-index; Future    cephalexin (KEFLEX) 500 mg capsule; Take 1 capsule (500 mg total) by mouth every 8 (eight) hours for 7 days    Encounter for immunization    Orders:    Tdap Vaccine greater than or equal to 8yo    Patient to return in approximately 10 days for wound evaluation and possible suture removal  Given the depth of the laceration and location she was started on prophylactic antibiotic therapy.  She was placed in a finger splint and advised to continue usage of the splint until she is reevaluated in the office  Secondarily given depth and visible tendon she was advised to get a x-ray of the finger today as there is a suspicion that this may be an open fracture.       History of Present Illness   Hand Injury   Pertinent negatives include no chest pain or numbness.         Pleasant 54-year-old female presents the office after sustaining injury to her left hand.  Patient states she was walking when she had a mechanical fall landing on an outstretched left hand.  Patient states she sustained a laceration to the left finger and has been having trouble moving the digit since that time.  Patient reports that she cleaned the wound with soap and water however continued to have some bleeding and was concerned that she may require stitches.  She is not up-to-date with her tetanus.  She is right-hand dominant.    Review of Systems   Constitutional:  Negative for activity change, appetite change, chills, fatigue and fever.   HENT:  Negative for congestion, dental problem, drooling, ear discharge, ear pain, facial swelling, postnasal  "drip, rhinorrhea and sinus pain.    Eyes:  Negative for photophobia, pain, discharge and itching.   Respiratory:  Negative for apnea, cough, chest tightness and shortness of breath.    Cardiovascular:  Negative for chest pain and leg swelling.   Gastrointestinal:  Negative for abdominal distention, abdominal pain, anal bleeding, constipation, diarrhea and nausea.   Endocrine: Negative for cold intolerance, heat intolerance and polydipsia.   Genitourinary:  Negative for difficulty urinating.   Musculoskeletal:  Negative for arthralgias, gait problem, joint swelling and myalgias.   Skin:  Negative for color change and pallor.   Allergic/Immunologic: Negative for immunocompromised state.   Neurological:  Negative for dizziness, seizures, facial asymmetry, weakness, light-headedness, numbness and headaches.   Psychiatric/Behavioral:  Negative for agitation, behavioral problems, confusion, decreased concentration and dysphoric mood.    All other systems reviewed and are negative.      Objective   /78 (BP Location: Left arm, Patient Position: Sitting, Cuff Size: Adult)   Pulse 75   Temp (!) 96.6 °F (35.9 °C) (Tympanic)   Ht 5' 7\" (1.702 m)   Wt 71 kg (156 lb 9.6 oz)   SpO2 99%   BMI 24.53 kg/m²      Physical Exam  Vitals and nursing note reviewed.   Constitutional:       General: She is not in acute distress.     Appearance: She is well-developed.   HENT:      Head: Normocephalic and atraumatic.   Eyes:      Conjunctiva/sclera: Conjunctivae normal.      Pupils: Pupils are equal, round, and reactive to light.   Cardiovascular:      Rate and Rhythm: Normal rate and regular rhythm.      Heart sounds: Normal heart sounds. No murmur heard.     No friction rub.   Pulmonary:      Effort: Pulmonary effort is normal.      Breath sounds: Normal breath sounds.   Abdominal:      General: Bowel sounds are normal.      Palpations: Abdomen is soft.   Musculoskeletal:         General: Normal range of motion.        Hands:     " "  Cervical back: Normal range of motion and neck supple.      Comments: Approximate 1 cm laceration to the left index finger as noted above.  Does not appear to be any vascular damage however the joint is moderately swollen and unable to assess range of motion secondary to pain.  However she does have intact distal and proximal sensation motor pulse.   Skin:     General: Skin is warm.      Capillary Refill: Capillary refill takes less than 2 seconds.   Neurological:      Mental Status: She is alert and oriented to person, place, and time.      Motor: No abnormal muscle tone.      Coordination: Coordination normal.   Psychiatric:         Behavior: Behavior normal.         Thought Content: Thought content normal.     Universal Protocol:  Procedure performed by:  Consent: Verbal consent obtained.  Risks and benefits: risks, benefits and alternatives were discussed  Time out: Immediately prior to procedure a \"time out\" was called to verify the correct patient, procedure, equipment, support staff and site/side marked as required.  Patient understanding: patient states understanding of the procedure being performed  Patient consent: the patient's understanding of the procedure matches consent given  Procedure consent: procedure consent matches procedure scheduled  Relevant documents: relevant documents present and verified  Test results: test results available and properly labeled  Site marked: the operative site was marked  Radiology Images displayed and confirmed. If images not available, report reviewed: imaging studies available  Required items: required blood products, implants, devices, and special equipment available  Patient identity confirmed: verbally with patient  Laceration repair    Date/Time: 3/11/2025 2:00 PM    Performed by: Danie Dickson MD  Authorized by: Danie Dickson MD  Body area: upper extremity  Location details: left index finger  Laceration length: 1 cm  Tendon involvement: none  Nerve " involvement: none  Vascular damage: no  Anesthesia: digital block    Anesthesia:  Local Anesthetic: lidocaine 1% with epinephrine  Anesthetic total: 3 mL    Sedation:  Patient sedated: no      Wound Dehiscence:  Superficial Wound Dehiscence: simple closure      Procedure Details:  Irrigation solution: saline  Debridement: none  Degree of undermining: none  Skin closure: 4-0 nylon  Number of sutures: 5  Approximation: close  Dressing: 4x4 sterile gauze and splint  Patient tolerance: patient tolerated the procedure well with no immediate complications  Comments: This was a very complex laceration as patient had visible tendon exposure however she was neurovascularly intact and there did not appear to be any damage to the tendon and she had full flexion extension of the finger.  A 5-0 Chromic Gut suture was used to reapproximate the deep structures there is a approximate 3 mm area of avulsed tissue missing.  After the deep sutures were reapproximated number five 4-0 nylon sutures were used to reapproximate the superficial skin.  Given the location of the laceration across the PIP patient was advised to utilize finger splint for the duration of the injury which I advised would approximately be 10 days.

## 2025-03-11 NOTE — PROGRESS NOTES
Name: Jody Galarza      : 1970      MRN: [id number]  Encounter Provider: ADRIANA Cabrera  Encounter Date: 3/11/2025   Encounter department: [facility] PRIMARY CARE  :  Assessment & Plan           History of Present Illness   Hand pain and injury status post fall this morning.    Hand Injury       Review of Systems    Objective   There were no vitals taken for this visit.     Physical Exam

## 2025-03-25 ENCOUNTER — OFFICE VISIT (OUTPATIENT)
Dept: FAMILY MEDICINE CLINIC | Facility: CLINIC | Age: 55
End: 2025-03-25
Payer: COMMERCIAL

## 2025-03-25 VITALS
OXYGEN SATURATION: 98 % | WEIGHT: 152 LBS | DIASTOLIC BLOOD PRESSURE: 78 MMHG | SYSTOLIC BLOOD PRESSURE: 110 MMHG | BODY MASS INDEX: 23.86 KG/M2 | HEART RATE: 66 BPM | HEIGHT: 67 IN | TEMPERATURE: 96.2 F

## 2025-03-25 DIAGNOSIS — S61.211A LACERATION OF LEFT INDEX FINGER, FOREIGN BODY PRESENCE UNSPECIFIED, NAIL DAMAGE STATUS UNSPECIFIED, INITIAL ENCOUNTER: ICD-10-CM

## 2025-03-25 DIAGNOSIS — Z48.02 VISIT FOR SUTURE REMOVAL: Primary | ICD-10-CM

## 2025-03-25 PROCEDURE — 99213 OFFICE O/P EST LOW 20 MIN: CPT | Performed by: STUDENT IN AN ORGANIZED HEALTH CARE EDUCATION/TRAINING PROGRAM

## 2025-03-25 NOTE — PROGRESS NOTES
Name: Jody Galarza      : 1970      MRN: [id number]  Encounter Provider: Danie Dickson MD  Encounter Date: 3/25/2025   Encounter department: [facility] PRIMARY CARE  :  Assessment & Plan  Visit for suture removal  Sutures successfully removed  There is some limited motion at the PIP and a bit more swelling than I would have expected status post 2 weeks of injury  As such we will refer to hand surgery just for consultation  Suspect that this is all stiffness from being in a finger splint for 2 weeks however there is some slight concern for possible tendon injury even though it was not visualized during the laceration repair is point to remove discontinue the finger splint    Orders:    Suture removal    Laceration of left index finger, foreign body presence unspecified, nail damage status unspecified, initial encounter    Orders:    Ambulatory Referral to Orthopedic Surgery; Future           History of Present Illness   HPI    54-year-old female presents to the office for suture removal and evaluation of recently repaired left digit laceration.  Denies any new issues concerns or problems since last seen.  She has completed her full course of antibiotic    Review of Systems   Constitutional:  Negative for chills and fever.   HENT:  Negative for ear pain and sore throat.    Eyes:  Negative for pain and visual disturbance.   Respiratory:  Negative for cough and shortness of breath.    Cardiovascular:  Negative for chest pain and palpitations.   Gastrointestinal:  Negative for abdominal pain and vomiting.   Genitourinary:  Negative for dysuria and hematuria.   Musculoskeletal:  Negative for arthralgias and back pain.   Skin:  Positive for wound. Negative for color change and rash.   Neurological:  Negative for seizures and syncope.   All other systems reviewed and are negative.      Objective   /78 (BP Location: Left arm, Patient Position: Sitting, Cuff Size: Standard)   Pulse 66   Temp  "(!) 96.2 °F (35.7 °C) (Tympanic)   Ht 5' 7\" (1.702 m)   Wt 68.9 kg (152 lb)   SpO2 98%   BMI 23.81 kg/m²        Suture removal    Date/Time: 3/25/2025 7:30 AM    Performed by: Danie Dickson MD  Authorized by: Danie Dickson MD  Universal Protocol:  procedure performed by consultantConsent: Verbal consent obtained.  Risks and benefits: risks, benefits and alternatives were discussed  Time out: Immediately prior to procedure a \"time out\" was called to verify the correct patient, procedure, equipment, support staff and site/side marked as required.  Patient understanding: patient states understanding of the procedure being performed  Patient consent: the patient's understanding of the procedure matches consent given  Procedure consent: procedure consent matches procedure scheduled  Relevant documents: relevant documents present and verified  Test results: test results available and properly labeled  Site marked: the operative site was marked  Radiology Images displayed and confirmed. If images not available, report reviewed: imaging studies available  Required items: required blood products, implants, devices, and special equipment available  Patient identity confirmed: verbally with patient      Patient location:  Clinic  Location:     Laterality:  Left    Location:  Upper extremity    Upper extremity location:  Hand  Procedure details:     Tools used:  Suture removal kit    Wound appearance:  No sign(s) of infection, clean, moist and pink    Number of sutures removed:  5  Post-procedure details:     Post-removal:  Band-Aid applied    Patient tolerance of procedure:  Tolerated well, no immediate complications        Physical Exam  Vitals and nursing note reviewed.   Constitutional:       General: She is not in acute distress.     Appearance: She is well-developed.   HENT:      Head: Normocephalic and atraumatic.   Eyes:      Conjunctiva/sclera: Conjunctivae normal.      Pupils: Pupils are equal, round, and " reactive to light.   Cardiovascular:      Rate and Rhythm: Normal rate and regular rhythm.      Heart sounds: Normal heart sounds. No murmur heard.     No friction rub.   Pulmonary:      Effort: Pulmonary effort is normal.      Breath sounds: Normal breath sounds.   Abdominal:      General: Bowel sounds are normal.      Palpations: Abdomen is soft.   Musculoskeletal:         General: Normal range of motion.      Cervical back: Normal range of motion and neck supple.      Comments: Well-healing laceration of the left hand as previously noted.  5 sutures intact no obvious infection full range of motion of the hand    However there is some limited range of motion at the PIP unclear if this is secondary from underlying tendon injury or just stiffness as patient has been in finger splint for 2 weeks   Skin:     General: Skin is warm.      Capillary Refill: Capillary refill takes less than 2 seconds.   Neurological:      Mental Status: She is alert and oriented to person, place, and time.      Motor: No abnormal muscle tone.      Coordination: Coordination normal.   Psychiatric:         Behavior: Behavior normal.         Thought Content: Thought content normal.

## 2025-03-26 ENCOUNTER — APPOINTMENT (OUTPATIENT)
Dept: RADIOLOGY | Facility: MEDICAL CENTER | Age: 55
End: 2025-03-26
Payer: COMMERCIAL

## 2025-03-26 ENCOUNTER — OFFICE VISIT (OUTPATIENT)
Dept: OBGYN CLINIC | Facility: CLINIC | Age: 55
End: 2025-03-26
Payer: COMMERCIAL

## 2025-03-26 VITALS
WEIGHT: 152 LBS | RESPIRATION RATE: 16 BRPM | BODY MASS INDEX: 23.86 KG/M2 | HEIGHT: 67 IN | OXYGEN SATURATION: 98 % | HEART RATE: 71 BPM | TEMPERATURE: 97.6 F

## 2025-03-26 DIAGNOSIS — S61.211A LACERATION OF LEFT INDEX FINGER, FOREIGN BODY PRESENCE UNSPECIFIED, NAIL DAMAGE STATUS UNSPECIFIED, INITIAL ENCOUNTER: ICD-10-CM

## 2025-03-26 PROCEDURE — 73130 X-RAY EXAM OF HAND: CPT

## 2025-03-26 PROCEDURE — 99203 OFFICE O/P NEW LOW 30 MIN: CPT | Performed by: STUDENT IN AN ORGANIZED HEALTH CARE EDUCATION/TRAINING PROGRAM

## 2025-03-26 NOTE — PROGRESS NOTES
ASSESSMENT/PLAN:    Assessment & Plan  Laceration of left index finger, foreign body presence unspecified, nail damage status unspecified, initial encounter  Volar laceration over the PIP joint of the index finger now well-healed  No evidence of tendon injury.  She does have a mild neuropraxia in the distribution of the ulnar digital nerve of the index finger with 8 mm 2-point discrimination testing.  I discussed with her that this likely demonstrates a neuropraxia rather than a full nerve laceration.  We will manage this with observation.  There is always the risk for development of a neuroma in situ that may require surgical intervention in the future however given her current sensation I would expect this to improve over time.  I did discuss that nerves take a year to improve fully.  Overall she has finger swelling and stiffness.  I have discussed with her edema control for the swelling and have referred her to physical therapy for range of motion.  X-rays of her left index finger reviewed and discussed with the patient  Discussed the use of Tylenol and Motrin over-the-counter for pain control including the dosing regimen of these medications  Follow-up in 6 weeks for a repeat clinical check  Orders:    Ambulatory Referral to Orthopedic Surgery    XR hand 3+ vw left; Future    Ambulatory Referral to PT/OT Hand Therapy; Future        _____________________________________________________  CHIEF COMPLAINT:  Left Index Finger pain      SUBJECTIVE:  Jody Galarza is a 54 y.o. right hand dominant female presenting for evaluation of left index finger. The patient states the injury occurred while she was walking and she had a mechanical fall landing on an outstretched hand. After injury he was initially evaluated by her PCP.  She received xrays and the laceration was sutured.  Since that time their pain has been moderately well-controlled.  They do not have numbness or tingling in the hand including no numbness or  tingling in the median nerve distribution.  There is no pain in the elbow or shoulder.  Here to establish care. She states she has limited sensation in the tip of there finger. She had the sutures removed yesterday. She is concerned with loss of range of motion.     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    Menopausal and postmenopausal disorder 05/24/2013    Migraine     Pneumonia     x1    RA (rheumatoid arthritis) (HCC)     Seasonal allergies     Wears contact lenses     Wears glasses     Wears glasses        PAST SURGICAL HISTORY:  Past Surgical History:   Procedure Laterality Date    COLONOSCOPY      [place] HYSTEROSCOPY BX ENDOMETRIUM&/POLYPC W/WO D&C N/A 6/6/2017    Procedure: DILATATION AND CURETTAGE (D&C) WITH HYSTEROSCOPY;  Surgeon: Justine Callahan DO;  Location: [facility];  Service: Gynecology    OK HYSTEROSCOPY BX ENDOMETRIUM&/POLYPC W/WO D&C N/A 9/3/2019    Procedure: DILATATION AND CURETTAGE (D&C) WITH HYSTEROSCOPY;  Surgeon: Justine Callahan DO;  Location: [facility];  Service: Gynecology    TONSILLECTOMY      WISDOM TOOTH EXTRACTION         FAMILY HISTORY:  Family History   Problem Relation Age of Onset    Hypertension Mother     Diabetes Mother     Stroke Mother     Hypertension Father     Stroke Father     No Known Problems Sister     No Known Problems Sister     Diabetes Daughter     No Known Problems Maternal Grandmother     No Known Problems Paternal Grandmother     Lymphoma Maternal Aunt     Breast cancer Maternal Aunt 60        due to lymphoma    No Known Problems  "Maternal Aunt     Diabetes Maternal Aunt     Hypertension Maternal Aunt     No Known Problems Maternal Aunt     No Known Problems Maternal Aunt     No Known Problems Maternal Aunt     No Known Problems Paternal Aunt     No Known Problems Paternal Aunt     No Known Problems Paternal Aunt        SOCIAL HISTORY:  Social History     Tobacco Use    Smoking status: Never    Smokeless tobacco: Never   Vaping Use    Vaping status: Never Used   Substance Use Topics    Alcohol use: Yes     Alcohol/week: 1.0 standard drink of alcohol     Types: 1 Shots of liquor per week     Comment: liquor    Drug use: No       MEDICATIONS:    Current Outpatient Medications:     albuterol (PROVENTIL HFA,VENTOLIN HFA) 90 mcg/act inhaler, Inhale 2 puffs every 4 (four) hours as needed , Disp: , Rfl:     hydroCHLOROthiazide 25 mg tablet, Take 1 tablet (25 mg total) by mouth daily, Disp: 30 tablet, Rfl: 5    LORazepam (ATIVAN) 0.5 mg tablet, TAKE 1 TABLET EVERY 8 HOURS AS NEEDED FOR ANXIETY..., Disp: 90 tablet, Rfl: 0    ALLERGIES:  Allergies   Allergen Reactions    Codeine        REVIEW OF SYSTEMS:  Pertinent items are noted in HPI.  A comprehensive review of systems was negative.    LABS:  HgA1c: No results found for: \"HGBA1C\"  BMP:   Lab Results   Component Value Date    CALCIUM 9.8 03/22/2024    K 4.4 03/22/2024    CO2 30 03/22/2024     03/22/2024    BUN 22 03/22/2024    CREATININE 0.87 03/22/2024         _____________________________________________________  PHYSICAL EXAMINATION:  Vital signs: Pulse 71   Temp 97.6 °F (36.4 °C) (Temporal)   Resp 16   Ht 5' 7\" (1.702 m)   Wt 68.9 kg (152 lb)   SpO2 98%   BMI 23.81 kg/m²   General: well developed and well nourished, alert, oriented times 3, and appears comfortable  Psychiatric: Normal  HEENT: Trachea Midline, No torticollis  Cardiovascular: No discernable arrhythmia  Pulmonary: No wheezing or stridor  Abdomen: No rebound or guarding  Extremities: No peripheral edema  Skin: No masses, " erythema, lacerations, fluctation, ulcerations  Neurovascular: Sensation Intact to the Median, Ulnar, Radial Nerve, Motor Intact to the Median, Ulnar, Radial Nerve, and Pulses Intact    MUSCULOSKELETAL EXAMINATION:  Left index finger  Skin intact  FDS and FDP intact  DCP at 4 cm with MCP flexion of about 45 degrees and PIP flexion of 60 degrees. DIP fexion of 15 degrees with full extension  Swelling and ecchymosis at the index finger  Two point Discrimination: Index finger Radial side at 5 mm and ulnar side at 8 mm  Range of Motion:  Elbow: extension/flexion 0/140  Forearm: pronation/supination 80/80  Wrist: extension/flexion 70/70  Digit: full AROM in DIP, PIP, MP joints  Motor Exam: firing AIN/PIN/U  Sensory Exam: Sensation intact to light touch in FDWS (radial), volar IF (median), volar SF (ulnar)  Vascular Exam: < 2 sec capillary refill           _____________________________________________________  STUDIES REVIEWED:  I reviewed imaging in PACS    X-rays of the left index finger from 3/11/2025 demonstrate no evidence of fracture or dislocation  Repeat x-rays of the left hand completed today additionally demonstrate no evidence of fracture or dislocation      PROCEDURES PERFORMED:  Procedures         Scribe Attestation      I,:  Laurie Lopes am acting as a scribe while in the presence of the attending physician.:       I,:  Josesito Hamilton MD personally performed the services described in this documentation    as scribed in my presence.:

## 2025-04-14 ENCOUNTER — EVALUATION (OUTPATIENT)
Dept: OCCUPATIONAL THERAPY | Facility: CLINIC | Age: 55
End: 2025-04-14
Payer: COMMERCIAL

## 2025-04-14 DIAGNOSIS — S61.211A LACERATION OF LEFT INDEX FINGER, FOREIGN BODY PRESENCE UNSPECIFIED, NAIL DAMAGE STATUS UNSPECIFIED, INITIAL ENCOUNTER: Primary | ICD-10-CM

## 2025-04-14 PROCEDURE — 97110 THERAPEUTIC EXERCISES: CPT

## 2025-04-14 PROCEDURE — 97165 OT EVAL LOW COMPLEX 30 MIN: CPT

## 2025-04-14 NOTE — PROGRESS NOTES
Occupational Therapy Initial Evaluation    Start date  Expiration date PT/OT + Visit Limit? Co-Insurance     36 pcy for OT/PT/SLP combined Co-insurance: 0%                               Visit/Unit Tracking  AUTH Status: Not required Date               Visits: 36 pcy  Used 1 (IE)               Remaining  35              FOTO COUNT  completed                PLAN OF CARE START: 25  PLAN OF CARE END: 25  PROGRESS NOTE DUE/FOTO DUE: Pt requested 1 visit   FREQUENCY: Pt requested 1 visit and HEP   PRECAUTIONS: spinal stenosis of lumbar region   DIAGNOSIS: laceration of L index finger (foreign body presence unspecified)   INSURANCE: [employer]       Today's date: 2025  Patient name: Jody Galarza  : 1970  MRN: [id number]  Referring provider: Josesito Hamilton MD  Dx:   Encounter Diagnosis     ICD-10-CM    1. Laceration of left index finger, foreign body presence unspecified, nail damage status unspecified, initial encounter  S61.211A Ambulatory Referral to PT/OT Hand Therapy             ASSESSMENT/PLAN:    SKILLED ANALYSIS:  Pt is a R hand dominant 54 year old female presenting to OP OT s/p L laceration of index finger due to fall.  Pt currently reports difficulty with edema in L index and MF and limited AROM right now. Additionally, hypersensitiity noted and extreme pain when bumping her finger into objects. Post assessments pt demonstrating the following: decreased L /digit strength, edema, and hypersensitivity. Pt requested this as 1time visit to be provided with HEP. Educated pt on desensitization techniques, edema massage, vibration with massage gun, joint blocking, AROM/PROM digit exercises and strengthening exercises with resistance such as rubber band and stress ball. Pt demonstrated good understanding. Findings and recommendations discussed with pt, and they are in agreement. Educated pt on charges of insurance, assessments performed with standardized norms, POC, goal  "creation, and OP OT services.       SUBJECTIVE:    Subjective  Occupational Profile  *lives with:   *vocation: clerical   *driving: independent   *ADLs: independent   *IADLs (cooking, cleaning, community mobility, medication management, finances): independent, challenges at work with keyboard activities    *Sport/Leisure: no problems reported       PATIENT GOAL: \"To have less swelling and move it more\"    PAIN: 0  Worst: 10/10 with bumping     HISTORY OF PRESENT ILLNESS:   Pt is a 54 y.o. female who was referred to Occupational Therapy s/p L index finger laceration. Pt saw Dr. Hamilton on 3/26/25 with EMR reporting,     \"Volar laceration over the PIP joint of the index finger now well-healed  No evidence of tendon injury.  She does have a mild neuropraxia in the distribution of the ulnar digital nerve of the index finger with 8 mm 2-point discrimination testing.  I discussed with her that this likely demonstrates a neuropraxia rather than a full nerve laceration.  We will manage this with observation.  There is always the risk for development of a neuroma in situ that may require surgical intervention in the future however given her current sensation I would expect this to improve over time.  I did discuss that nerves take a year to improve fully.  Overall she has finger swelling and stiffness.  I have discussed with her edema control for the swelling and have referred her to physical therapy for range of motion.  X-rays of her left index finger reviewed and discussed with the patient  Discussed the use of Tylenol and Motrin over-the-counter for pain control including the dosing regimen of these medications  Follow-up in 6 weeks for a repeat clinical check\"    Additionally report states, \"The patient states the injury occurred while she was walking and she had a mechanical fall landing on an outstretched hand. After injury he was initially evaluated by her PCP.  She received xrays and the laceration was sutured.  Since " "that time their pain has been moderately well-controlled.  They do not have numbness or tingling in the hand including no numbness or tingling in the median nerve distribution.  There is no pain in the elbow or shoulder.  Here to establish care. She states she has limited sensation in the tip of there finger. She had the sutures removed yesterday. She is concerned with loss of range of motion.\"     Today pt reports that in March she was outside walking and fell on her L hand and received sutures on index finger. Has since had sutures removed. Pt reports today with edema on L index and MF.          PMH:   Past Medical History:   Diagnosis Date    Abnormal Pap smear of cervix     Adhesive capsulitis of left shoulder 07/27/2020    Allergic     Ankylosing spondylitis (HCC)     Anxiety     Arthritis     Back pain     Lumbar    Fibroid uterus     Fibroids 02/10/2017    Heavy menses 10/08/2015    Irregular bleeding 02/10/2017    Irregular menses     D&C today 9/3/2019    Joint pain     Has R.A.    Menopausal and postmenopausal disorder 05/24/2013    Migraine     Pneumonia     x1    RA (rheumatoid arthritis) (HCC)     Seasonal allergies     Wears contact lenses     Wears glasses     Wears glasses        Past Surgical Hx:   Past Surgical History:   Procedure Laterality Date    COLONOSCOPY      [place] HYSTEROSCOPY BX ENDOMETRIUM&/POLYPC W/WO D&C N/A 6/6/2017    Procedure: DILATATION AND CURETTAGE (D&C) WITH HYSTEROSCOPY;  Surgeon: Justine Callahan DO;  Location: [facility];  Service: Gynecology    [place] HYSTEROSCOPY BX ENDOMETRIUM&/POLYPC W/WO D&C N/A 9/3/2019    Procedure: DILATATION AND CURETTAGE (D&C) WITH HYSTEROSCOPY;  Surgeon: Justine Callahan DO;  Location: [facility];  Service: Gynecology    TONSILLECTOMY      WISDOM TOOTH EXTRACTION            OBJECTIVE:    Impairment Observations     Tissue Integrity/Surgical Incision: sutures removed, healing as expected   i    Edema (circumferential) (cm):   Right Left   Base of index 6.5 cm  " 6.9   Base of Middle  6 cm  6 cm          RUE   Date: 4/14 LUE   Date: 4/14 Comments           /PINCH STRENGTH   Intact Impaired Dominant Hand: R   Dynamometer    - Gross Grasp   75 lbs 25 lbs    Pinch Meter     - Pincer   9 lbs 2 lbs     - Tripod   15 lbs 3 lbs     - Lateral   14 lbs  10 lbs          DIGIT AROM              RUE   Date: 4/14 LUE   Date: 4/14 Comments    Intact Impaired    2nd Digit MCP  Normal: 0-90 for following 75 55    2nd Digit PIP  Normal: 0-100 for following 95 60    2nd Digit DIP  Normal: 0-90 for following WFL 30    Opposition  WFL WFL            COORDINATION       RUE   Date: 4/14 LUE   Date: 4/14     Intact Impaired    9 Hole Peg Test-  assesses dexterity/fine motor coordination        15 seconds 23 seconds Pt demonstrates decreased FMC compared to norms for age/sex     Fxnl Dexterity Test-assesses patient's ability to use the hand for daily tasks requiring a 3-jaw mat prehension between the fingers and the thumb     17 seconds 23 seconds Pt demonstrates decreased dexterity compared to norms for age/sex         SENSATION       RUE  Date:  LUE  Date: 4/14    Monofilament Testing  Normal: 2.83 mm    Diminished light touch: 3.61 mm    Diminished protective sensation: 4.31 mm    Loss of protective sensation: 4.56    Complete loss of sensation / deep pressure: 6.65 Not Tested 2.83 for all digits  Dysesthesia        GOALS     Pt will be consistent with HEP demonstrated by teach back method for increased ROM and strengthening of  index and MF for functional activities and life roles ~Met     Pt will demonstrate good understanding of desensitization program by teach back method for decreased hypersensitivity over incision site of  index and MF for functional activities and life roles ~Met       INTERVENTIONS:     IASTM  Joint mobilizations  Kinesiotaping  Strengthening  Stretching   Sensory Integrative Techniques   Therapeutic activities  Therapeutic exercise  FMC  Graded activity/graded  exercise  HEP  Ultrasound  Paraffin bath   Cryotherapy   Moist Heat  TENS  Hydrocollator packs     PROTOCOL:     ORTHO ORDERS:    Evaluate and Treat left index finger injury. Focus on range of motion and edema control. Modalities as indicated. May only need 1 session to demonstrate exercises and then transition to home exercise program.       SPLINT:     Manuals                                                                          Neuro Re-Ed                                          Ther Ex                                                                                                            Ther Activity            Education on HEP                                                                Modalities            Ultrasound            E-STIM w/ MH            CP            MH            Ice Massage

## 2025-06-30 ENCOUNTER — HOSPITAL ENCOUNTER (OUTPATIENT)
Dept: MAMMOGRAPHY | Facility: HOSPITAL | Age: 55
Discharge: HOME | End: 2025-06-30
Attending: OBSTETRICS & GYNECOLOGY
Payer: COMMERCIAL

## 2025-06-30 DIAGNOSIS — Z12.31 ENCOUNTER FOR SCREENING MAMMOGRAM FOR BREAST CANCER: ICD-10-CM

## 2025-06-30 PROCEDURE — 77067 SCR MAMMO BI INCL CAD: CPT

## 2025-06-30 PROCEDURE — 77063 BREAST TOMOSYNTHESIS BI: CPT

## (undated) DEVICE — GLOVE SRG BIOGEL ECLIPSE 6.5

## (undated) DEVICE — GLOVE PI ULTRA TOUCH SZ.7.0

## (undated) DEVICE — SYRINGE 10ML LL CONTROL TOP

## (undated) DEVICE — STRL ALLENTOWN HYSTEROSCOPY PK: Brand: CARDINAL HEALTH

## (undated) DEVICE — UNDER BUTTOCKS DRAPE W/FLUID CONTROL POUCH: Brand: CONVERTORS

## (undated) DEVICE — NEEDLE SPINAL 22G X 3.5IN  QUINCKE

## (undated) DEVICE — CYSTO TUBING SINGLE IRRIGATION

## (undated) DEVICE — GLOVE PI ULTRA TOUCH SZ.6.5

## (undated) DEVICE — PREMIUM DRY TRAY LF: Brand: MEDLINE INDUSTRIES, INC.

## (undated) DEVICE — PVC URETHRAL CATHETER: Brand: DOVER

## (undated) DEVICE — EXIDINE 4 PCT

## (undated) DEVICE — STRAIGHT CATH RED RUBBER 12FR

## (undated) DEVICE — GLOVE SRG LF STRL BGL SKNSNS 7 PF

## (undated) DEVICE — GLOVE INDICATOR PI UNDERGLOVE SZ 7.5 BLUE

## (undated) DEVICE — SCD SEQUENTIAL COMPRESSION COMFORT SLEEVE MEDIUM KNEE LENGTH: Brand: KENDALL SCD

## (undated) DEVICE — ASTOUND STANDARD SURGICAL GOWN, XXL: Brand: CONVERTORS

## (undated) DEVICE — GLOVE INDICATOR PI UNDERGLOVE SZ 6.5 BLUE

## (undated) DEVICE — GLOVE SRG BIOGEL 6.5

## (undated) DEVICE — IV EXTENSION TUBING 33 IN

## (undated) DEVICE — 2000CC GUARDIAN II: Brand: GUARDIAN